# Patient Record
Sex: MALE | Race: WHITE | NOT HISPANIC OR LATINO | Employment: FULL TIME | URBAN - METROPOLITAN AREA
[De-identification: names, ages, dates, MRNs, and addresses within clinical notes are randomized per-mention and may not be internally consistent; named-entity substitution may affect disease eponyms.]

---
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
## 2021-03-08 ENCOUNTER — TELEPHONE (OUTPATIENT)
Dept: NEUROLOGY | Facility: HOSPITAL | Age: 21
End: 2021-03-08

## 2021-03-12 ENCOUNTER — TELEPHONE (OUTPATIENT)
Dept: NEUROLOGY | Facility: HOSPITAL | Age: 21
End: 2021-03-12

## 2021-05-25 ENCOUNTER — TELEPHONE (OUTPATIENT)
Dept: PEDIATRICS | Facility: CLINIC | Age: 21
End: 2021-05-25

## 2021-07-06 ENCOUNTER — TELEPHONE (OUTPATIENT)
Dept: NEUROLOGY | Facility: HOSPITAL | Age: 21
End: 2021-07-06

## 2021-07-09 ENCOUNTER — OCCUPATIONAL HEALTH (OUTPATIENT)
Dept: URGENT CARE | Facility: CLINIC | Age: 21
End: 2021-07-09

## 2021-07-09 ENCOUNTER — TELEPHONE (OUTPATIENT)
Dept: INTERNAL MEDICINE | Facility: CLINIC | Age: 21
End: 2021-07-09

## 2021-07-09 DIAGNOSIS — Z00.00 PHYSICAL EXAM: Primary | ICD-10-CM

## 2021-07-09 PROCEDURE — 99499 RETURN TO WORK EXAM: BASIC: ICD-10-PCS | Mod: S$GLB,,, | Performed by: PREVENTIVE MEDICINE

## 2021-07-09 PROCEDURE — 99499 UNLISTED E&M SERVICE: CPT | Mod: S$GLB,,, | Performed by: PREVENTIVE MEDICINE

## 2021-07-12 ENCOUNTER — OCCUPATIONAL HEALTH (OUTPATIENT)
Dept: URGENT CARE | Facility: CLINIC | Age: 21
End: 2021-07-12

## 2021-07-12 ENCOUNTER — OFFICE VISIT (OUTPATIENT)
Dept: FAMILY MEDICINE | Facility: CLINIC | Age: 21
End: 2021-07-12
Payer: COMMERCIAL

## 2021-07-12 VITALS
SYSTOLIC BLOOD PRESSURE: 118 MMHG | BODY MASS INDEX: 36.03 KG/M2 | TEMPERATURE: 98 F | HEIGHT: 72 IN | HEART RATE: 70 BPM | RESPIRATION RATE: 18 BRPM | WEIGHT: 266 LBS | DIASTOLIC BLOOD PRESSURE: 80 MMHG | OXYGEN SATURATION: 98 %

## 2021-07-12 DIAGNOSIS — R11.2 NON-INTRACTABLE VOMITING WITH NAUSEA, UNSPECIFIED VOMITING TYPE: Primary | ICD-10-CM

## 2021-07-12 DIAGNOSIS — Z02.89 ENCOUNTER FOR PHYSICAL EXAMINATION RELATED TO EMPLOYMENT: ICD-10-CM

## 2021-07-12 PROCEDURE — 3008F PR BODY MASS INDEX (BMI) DOCUMENTED: ICD-10-PCS | Mod: CPTII,S$GLB,, | Performed by: INTERNAL MEDICINE

## 2021-07-12 PROCEDURE — 99499 UNLISTED E&M SERVICE: CPT | Mod: S$GLB,,, | Performed by: NURSE PRACTITIONER

## 2021-07-12 PROCEDURE — 99204 OFFICE O/P NEW MOD 45 MIN: CPT | Mod: S$GLB,,, | Performed by: INTERNAL MEDICINE

## 2021-07-12 PROCEDURE — 1126F PR PAIN SEVERITY QUANTIFIED, NO PAIN PRESENT: ICD-10-PCS | Mod: S$GLB,,, | Performed by: INTERNAL MEDICINE

## 2021-07-12 PROCEDURE — 99204 PR OFFICE/OUTPT VISIT, NEW, LEVL IV, 45-59 MIN: ICD-10-PCS | Mod: S$GLB,,, | Performed by: INTERNAL MEDICINE

## 2021-07-12 PROCEDURE — 1126F AMNT PAIN NOTED NONE PRSNT: CPT | Mod: S$GLB,,, | Performed by: INTERNAL MEDICINE

## 2021-07-12 PROCEDURE — 99999 PR PBB SHADOW E&M-EST. PATIENT-LVL III: ICD-10-PCS | Mod: PBBFAC,,, | Performed by: INTERNAL MEDICINE

## 2021-07-12 PROCEDURE — 3008F BODY MASS INDEX DOCD: CPT | Mod: CPTII,S$GLB,, | Performed by: INTERNAL MEDICINE

## 2021-07-12 PROCEDURE — 99999 PR PBB SHADOW E&M-EST. PATIENT-LVL III: CPT | Mod: PBBFAC,,, | Performed by: INTERNAL MEDICINE

## 2021-07-12 PROCEDURE — 99499 RETURN TO WORK EXAM: BASIC: ICD-10-PCS | Mod: S$GLB,,, | Performed by: NURSE PRACTITIONER

## 2021-07-12 RX ORDER — ONDANSETRON 4 MG/1
4 TABLET, FILM COATED ORAL EVERY 8 HOURS PRN
Qty: 30 TABLET | Refills: 0 | Status: SHIPPED | OUTPATIENT
Start: 2021-07-12 | End: 2021-08-03

## 2021-07-26 ENCOUNTER — OCCUPATIONAL HEALTH (OUTPATIENT)
Dept: URGENT CARE | Facility: CLINIC | Age: 21
End: 2021-07-26

## 2021-07-26 DIAGNOSIS — Z02.1 PRE-EMPLOYMENT EXAMINATION: Primary | ICD-10-CM

## 2021-07-26 PROCEDURE — 99499 UNLISTED E&M SERVICE: CPT | Mod: S$GLB,,, | Performed by: EMERGENCY MEDICINE

## 2021-07-26 PROCEDURE — 99499 RETURN TO WORK EXAM: BASIC: ICD-10-PCS | Mod: S$GLB,,, | Performed by: EMERGENCY MEDICINE

## 2021-07-28 ENCOUNTER — TELEPHONE (OUTPATIENT)
Dept: NEUROLOGY | Facility: HOSPITAL | Age: 21
End: 2021-07-28

## 2021-07-30 ENCOUNTER — TELEPHONE (OUTPATIENT)
Dept: NEUROLOGY | Facility: HOSPITAL | Age: 21
End: 2021-07-30

## 2021-07-30 ENCOUNTER — PATIENT MESSAGE (OUTPATIENT)
Dept: NEUROLOGY | Facility: HOSPITAL | Age: 21
End: 2021-07-30

## 2021-08-02 ENCOUNTER — HOSPITAL ENCOUNTER (OUTPATIENT)
Dept: RADIOLOGY | Facility: HOSPITAL | Age: 21
Discharge: HOME | End: 2021-08-02
Attending: INTERNAL MEDICINE
Payer: COMMERCIAL

## 2021-08-02 DIAGNOSIS — Z85.9 HISTORY OF MALIGNANT NEUROENDOCRINE TUMOR: ICD-10-CM

## 2021-08-02 PROCEDURE — A9585 GADOBUTROL INJECTION: HCPCS | Performed by: INTERNAL MEDICINE

## 2021-08-02 PROCEDURE — 74183 MRI ABD W/O CNTR FLWD CNTR: CPT | Mod: TC

## 2021-08-02 PROCEDURE — 74183 MRI ABDOMEN W WO CONTRAST: ICD-10-PCS | Mod: 26,,, | Performed by: RADIOLOGY

## 2021-08-02 PROCEDURE — 25500020 PHARM REV CODE 255: Performed by: INTERNAL MEDICINE

## 2021-08-02 PROCEDURE — 74183 MRI ABD W/O CNTR FLWD CNTR: CPT | Mod: 26,,, | Performed by: RADIOLOGY

## 2021-08-02 RX ORDER — GADOBUTROL 604.72 MG/ML
10 INJECTION INTRAVENOUS
Status: COMPLETED | OUTPATIENT
Start: 2021-08-02 | End: 2021-08-02

## 2021-08-02 RX ADMIN — GADOBUTROL 10 ML: 604.72 INJECTION INTRAVENOUS at 10:08

## 2021-08-03 ENCOUNTER — PATIENT MESSAGE (OUTPATIENT)
Dept: NEUROLOGY | Facility: HOSPITAL | Age: 21
End: 2021-08-03

## 2021-08-03 ENCOUNTER — OFFICE VISIT (OUTPATIENT)
Dept: NEUROLOGY | Facility: HOSPITAL | Age: 21
End: 2021-08-03
Attending: INTERNAL MEDICINE
Payer: COMMERCIAL

## 2021-08-03 DIAGNOSIS — D3A.8 NEUROENDOCRINE TUMOR OF PANCREAS: Primary | ICD-10-CM

## 2021-08-03 DIAGNOSIS — K75.4 AUTOIMMUNE HEPATITIS: ICD-10-CM

## 2021-08-03 DIAGNOSIS — C77.9 REGIONAL LYMPH NODE METASTASIS PRESENT: ICD-10-CM

## 2021-08-03 DIAGNOSIS — Z12.89 ENCOUNTER FOR SCREENING FOR MALIGNANT NEOPLASM OF OTHER SITES: ICD-10-CM

## 2021-08-03 PROBLEM — Z85.9 HISTORY OF MALIGNANT NEUROENDOCRINE TUMOR: Status: RESOLVED | Noted: 2020-08-21 | Resolved: 2021-08-03

## 2021-08-10 ENCOUNTER — TELEPHONE (OUTPATIENT)
Dept: HEPATOLOGY | Facility: CLINIC | Age: 21
End: 2021-08-10

## 2021-08-11 ENCOUNTER — OFFICE VISIT (OUTPATIENT)
Dept: HEPATOLOGY | Facility: CLINIC | Age: 21
End: 2021-08-11
Payer: COMMERCIAL

## 2021-08-11 VITALS
OXYGEN SATURATION: 96 % | TEMPERATURE: 99 F | BODY MASS INDEX: 36.28 KG/M2 | SYSTOLIC BLOOD PRESSURE: 129 MMHG | DIASTOLIC BLOOD PRESSURE: 63 MMHG | HEIGHT: 72 IN | HEART RATE: 75 BPM | RESPIRATION RATE: 18 BRPM | WEIGHT: 267.88 LBS

## 2021-08-11 DIAGNOSIS — R74.8 ELEVATED LIVER ENZYMES: ICD-10-CM

## 2021-08-11 DIAGNOSIS — K75.4 AUTOIMMUNE HEPATITIS: ICD-10-CM

## 2021-08-11 DIAGNOSIS — K75.81 NASH (NONALCOHOLIC STEATOHEPATITIS): Primary | ICD-10-CM

## 2021-08-11 DIAGNOSIS — K74.01 HEPATIC FIBROSIS, EARLY FIBROSIS: ICD-10-CM

## 2021-08-11 PROCEDURE — 99204 OFFICE O/P NEW MOD 45 MIN: CPT | Mod: S$GLB,,, | Performed by: NURSE PRACTITIONER

## 2021-08-11 PROCEDURE — 3008F PR BODY MASS INDEX (BMI) DOCUMENTED: ICD-10-PCS | Mod: CPTII,S$GLB,, | Performed by: NURSE PRACTITIONER

## 2021-08-11 PROCEDURE — 3074F SYST BP LT 130 MM HG: CPT | Mod: CPTII,S$GLB,, | Performed by: NURSE PRACTITIONER

## 2021-08-11 PROCEDURE — 3074F PR MOST RECENT SYSTOLIC BLOOD PRESSURE < 130 MM HG: ICD-10-PCS | Mod: CPTII,S$GLB,, | Performed by: NURSE PRACTITIONER

## 2021-08-11 PROCEDURE — 99999 PR PBB SHADOW E&M-EST. PATIENT-LVL IV: ICD-10-PCS | Mod: PBBFAC,,, | Performed by: NURSE PRACTITIONER

## 2021-08-11 PROCEDURE — 3008F BODY MASS INDEX DOCD: CPT | Mod: CPTII,S$GLB,, | Performed by: NURSE PRACTITIONER

## 2021-08-11 PROCEDURE — 1159F PR MEDICATION LIST DOCUMENTED IN MEDICAL RECORD: ICD-10-PCS | Mod: CPTII,S$GLB,, | Performed by: NURSE PRACTITIONER

## 2021-08-11 PROCEDURE — 99999 PR PBB SHADOW E&M-EST. PATIENT-LVL IV: CPT | Mod: PBBFAC,,, | Performed by: NURSE PRACTITIONER

## 2021-08-11 PROCEDURE — 1126F PR PAIN SEVERITY QUANTIFIED, NO PAIN PRESENT: ICD-10-PCS | Mod: CPTII,S$GLB,, | Performed by: NURSE PRACTITIONER

## 2021-08-11 PROCEDURE — 3078F DIAST BP <80 MM HG: CPT | Mod: CPTII,S$GLB,, | Performed by: NURSE PRACTITIONER

## 2021-08-11 PROCEDURE — 1159F MED LIST DOCD IN RCRD: CPT | Mod: CPTII,S$GLB,, | Performed by: NURSE PRACTITIONER

## 2021-08-11 PROCEDURE — 1126F AMNT PAIN NOTED NONE PRSNT: CPT | Mod: CPTII,S$GLB,, | Performed by: NURSE PRACTITIONER

## 2021-08-11 PROCEDURE — 3078F PR MOST RECENT DIASTOLIC BLOOD PRESSURE < 80 MM HG: ICD-10-PCS | Mod: CPTII,S$GLB,, | Performed by: NURSE PRACTITIONER

## 2021-08-11 PROCEDURE — 99204 PR OFFICE/OUTPT VISIT, NEW, LEVL IV, 45-59 MIN: ICD-10-PCS | Mod: S$GLB,,, | Performed by: NURSE PRACTITIONER

## 2021-08-12 PROBLEM — K75.81 NASH (NONALCOHOLIC STEATOHEPATITIS): Status: ACTIVE | Noted: 2021-08-12

## 2021-08-12 PROBLEM — K74.01 HEPATIC FIBROSIS, EARLY FIBROSIS: Status: ACTIVE | Noted: 2021-08-12

## 2021-08-12 PROBLEM — R11.2 NON-INTRACTABLE VOMITING WITH NAUSEA: Status: RESOLVED | Noted: 2021-07-12 | Resolved: 2021-08-12

## 2021-08-12 PROBLEM — R93.5 ABNORMAL CT OF THE ABDOMEN: Status: RESOLVED | Noted: 2017-08-25 | Resolved: 2021-08-12

## 2021-08-13 ENCOUNTER — TELEPHONE (OUTPATIENT)
Dept: HEPATOLOGY | Facility: CLINIC | Age: 21
End: 2021-08-13

## 2021-08-13 DIAGNOSIS — K75.4 AUTOIMMUNE HEPATITIS: Primary | ICD-10-CM

## 2021-08-13 DIAGNOSIS — K75.81 NASH (NONALCOHOLIC STEATOHEPATITIS): ICD-10-CM

## 2021-08-13 DIAGNOSIS — K75.81 NASH (NONALCOHOLIC STEATOHEPATITIS): Primary | ICD-10-CM

## 2021-08-17 ENCOUNTER — TELEPHONE (OUTPATIENT)
Dept: HEPATOLOGY | Facility: CLINIC | Age: 21
End: 2021-08-17

## 2021-08-18 ENCOUNTER — TELEPHONE (OUTPATIENT)
Dept: HEPATOLOGY | Facility: CLINIC | Age: 21
End: 2021-08-18

## 2021-08-19 ENCOUNTER — LAB VISIT (OUTPATIENT)
Dept: LAB | Facility: HOSPITAL | Age: 21
End: 2021-08-19
Payer: COMMERCIAL

## 2021-08-19 ENCOUNTER — TELEPHONE (OUTPATIENT)
Dept: HEPATOLOGY | Facility: CLINIC | Age: 21
End: 2021-08-19

## 2021-08-19 DIAGNOSIS — K74.00 LIVER FIBROSIS: ICD-10-CM

## 2021-08-19 DIAGNOSIS — K75.4 AUTOIMMUNE HEPATITIS: Primary | ICD-10-CM

## 2021-08-19 DIAGNOSIS — K75.81 NASH (NONALCOHOLIC STEATOHEPATITIS): ICD-10-CM

## 2021-08-19 DIAGNOSIS — K75.4 AUTOIMMUNE HEPATITIS: ICD-10-CM

## 2021-08-19 PROCEDURE — 88321 PR  MICROSLIDE CONSULT: ICD-10-PCS | Mod: ,,, | Performed by: PATHOLOGY

## 2021-08-19 PROCEDURE — 88321 CONSLTJ&REPRT SLD PREP ELSWR: CPT | Mod: ,,, | Performed by: PATHOLOGY

## 2021-08-19 PROCEDURE — 88321 CONSLTJ&REPRT SLD PREP ELSWR: CPT | Performed by: PATHOLOGY

## 2021-08-23 LAB
COMMENT: NORMAL
FINAL PATHOLOGIC DIAGNOSIS: NORMAL
GROSS: NORMAL
Lab: NORMAL
MICROSCOPIC EXAM: NORMAL

## 2021-08-25 ENCOUNTER — TELEPHONE (OUTPATIENT)
Dept: HEPATOLOGY | Facility: CLINIC | Age: 21
End: 2021-08-25

## 2021-09-28 ENCOUNTER — OCCUPATIONAL HEALTH (OUTPATIENT)
Dept: URGENT CARE | Facility: CLINIC | Age: 21
End: 2021-09-28

## 2021-09-28 DIAGNOSIS — Z76.89 RETURN TO WORK EVALUATION: Primary | ICD-10-CM

## 2021-09-28 PROCEDURE — 99499 RETURN TO WORK EXAM: BASIC: ICD-10-PCS | Mod: S$GLB,,, | Performed by: EMERGENCY MEDICINE

## 2021-09-28 PROCEDURE — 99499 UNLISTED E&M SERVICE: CPT | Mod: S$GLB,,, | Performed by: EMERGENCY MEDICINE

## 2021-11-18 ENCOUNTER — LAB VISIT (OUTPATIENT)
Dept: LAB | Facility: HOSPITAL | Age: 21
End: 2021-11-18
Attending: NURSE PRACTITIONER
Payer: COMMERCIAL

## 2021-11-18 DIAGNOSIS — K75.4 AUTOIMMUNE HEPATITIS: ICD-10-CM

## 2021-11-18 DIAGNOSIS — K75.81 NASH (NONALCOHOLIC STEATOHEPATITIS): ICD-10-CM

## 2021-11-18 LAB
ALBUMIN SERPL BCP-MCNC: 4.3 G/DL (ref 3.5–5.2)
ALP SERPL-CCNC: 65 U/L (ref 55–135)
ALT SERPL W/O P-5'-P-CCNC: 69 U/L (ref 10–44)
ANION GAP SERPL CALC-SCNC: 7 MMOL/L (ref 8–16)
AST SERPL-CCNC: 37 U/L (ref 10–40)
BILIRUB SERPL-MCNC: 1.1 MG/DL (ref 0.1–1)
BUN SERPL-MCNC: 13 MG/DL (ref 6–20)
CALCIUM SERPL-MCNC: 9.8 MG/DL (ref 8.7–10.5)
CHLORIDE SERPL-SCNC: 103 MMOL/L (ref 95–110)
CO2 SERPL-SCNC: 29 MMOL/L (ref 23–29)
CREAT SERPL-MCNC: 1.1 MG/DL (ref 0.5–1.4)
EST. GFR  (AFRICAN AMERICAN): >60 ML/MIN/1.73 M^2
EST. GFR  (NON AFRICAN AMERICAN): >60 ML/MIN/1.73 M^2
ESTIMATED AVG GLUCOSE: 100 MG/DL (ref 68–131)
GLUCOSE SERPL-MCNC: 99 MG/DL (ref 70–110)
HBA1C MFR BLD: 5.1 % (ref 4–5.6)
POTASSIUM SERPL-SCNC: 4.1 MMOL/L (ref 3.5–5.1)
PROT SERPL-MCNC: 7.2 G/DL (ref 6–8.4)
SODIUM SERPL-SCNC: 139 MMOL/L (ref 136–145)

## 2021-11-18 PROCEDURE — 80053 COMPREHEN METABOLIC PANEL: CPT | Performed by: NURSE PRACTITIONER

## 2021-11-18 PROCEDURE — 36415 COLL VENOUS BLD VENIPUNCTURE: CPT | Performed by: NURSE PRACTITIONER

## 2021-11-18 PROCEDURE — 83036 HEMOGLOBIN GLYCOSYLATED A1C: CPT | Performed by: NURSE PRACTITIONER

## 2021-12-05 ENCOUNTER — TELEPHONE (OUTPATIENT)
Dept: HEPATOLOGY | Facility: CLINIC | Age: 21
End: 2021-12-05
Payer: COMMERCIAL

## 2021-12-08 ENCOUNTER — TELEPHONE (OUTPATIENT)
Dept: FAMILY MEDICINE | Facility: CLINIC | Age: 21
End: 2021-12-08
Payer: COMMERCIAL

## 2021-12-08 ENCOUNTER — PATIENT MESSAGE (OUTPATIENT)
Dept: FAMILY MEDICINE | Facility: CLINIC | Age: 21
End: 2021-12-08
Payer: COMMERCIAL

## 2021-12-08 DIAGNOSIS — U07.1 COVID-19: Primary | ICD-10-CM

## 2021-12-09 ENCOUNTER — INFUSION (OUTPATIENT)
Dept: INFECTIOUS DISEASES | Facility: HOSPITAL | Age: 21
End: 2021-12-09
Attending: INTERNAL MEDICINE
Payer: COMMERCIAL

## 2021-12-09 VITALS
HEIGHT: 73 IN | OXYGEN SATURATION: 98 % | SYSTOLIC BLOOD PRESSURE: 138 MMHG | HEART RATE: 92 BPM | BODY MASS INDEX: 33.13 KG/M2 | TEMPERATURE: 98 F | DIASTOLIC BLOOD PRESSURE: 75 MMHG | RESPIRATION RATE: 16 BRPM | WEIGHT: 250 LBS

## 2021-12-09 DIAGNOSIS — U07.1 COVID-19: Primary | ICD-10-CM

## 2021-12-09 PROCEDURE — M0243 CASIRIVI AND IMDEVI INFUSION: HCPCS | Performed by: INTERNAL MEDICINE

## 2021-12-09 PROCEDURE — 25000003 PHARM REV CODE 250: Performed by: INTERNAL MEDICINE

## 2021-12-09 PROCEDURE — 63600175 PHARM REV CODE 636 W HCPCS: Performed by: INTERNAL MEDICINE

## 2021-12-09 RX ORDER — EPINEPHRINE 0.3 MG/.3ML
0.3 INJECTION SUBCUTANEOUS
Status: DISCONTINUED | OUTPATIENT
Start: 2021-12-09 | End: 2022-07-18

## 2021-12-09 RX ORDER — SODIUM CHLORIDE 0.9 % (FLUSH) 0.9 %
10 SYRINGE (ML) INJECTION
Status: DISCONTINUED | OUTPATIENT
Start: 2021-12-09 | End: 2022-07-18

## 2021-12-09 RX ORDER — ONDANSETRON 4 MG/1
4 TABLET, ORALLY DISINTEGRATING ORAL ONCE AS NEEDED
Status: DISCONTINUED | OUTPATIENT
Start: 2021-12-09 | End: 2022-07-18

## 2021-12-09 RX ORDER — ALBUTEROL SULFATE 90 UG/1
2 AEROSOL, METERED RESPIRATORY (INHALATION)
Status: DISCONTINUED | OUTPATIENT
Start: 2021-12-09 | End: 2022-07-18

## 2021-12-09 RX ORDER — DIPHENHYDRAMINE HYDROCHLORIDE 50 MG/ML
25 INJECTION INTRAMUSCULAR; INTRAVENOUS ONCE AS NEEDED
Status: DISCONTINUED | OUTPATIENT
Start: 2021-12-09 | End: 2022-07-18

## 2021-12-09 RX ADMIN — CASIRIVIMAB AND IMDEVIMAB 600 MG: 600; 600 INJECTION, SOLUTION, CONCENTRATE INTRAVENOUS at 11:12

## 2021-12-17 ENCOUNTER — TELEPHONE (OUTPATIENT)
Dept: HEPATOLOGY | Facility: CLINIC | Age: 21
End: 2021-12-17
Payer: COMMERCIAL

## 2022-01-07 ENCOUNTER — PATIENT MESSAGE (OUTPATIENT)
Dept: HEPATOLOGY | Facility: CLINIC | Age: 22
End: 2022-01-07
Payer: COMMERCIAL

## 2022-02-07 ENCOUNTER — LAB VISIT (OUTPATIENT)
Dept: LAB | Facility: HOSPITAL | Age: 22
End: 2022-02-07
Attending: NURSE PRACTITIONER
Payer: COMMERCIAL

## 2022-02-07 DIAGNOSIS — K75.4 AUTOIMMUNE HEPATITIS: ICD-10-CM

## 2022-02-07 DIAGNOSIS — K75.81 NASH (NONALCOHOLIC STEATOHEPATITIS): ICD-10-CM

## 2022-02-07 LAB
ALBUMIN SERPL BCP-MCNC: 4.2 G/DL (ref 3.5–5.2)
ALP SERPL-CCNC: 64 U/L (ref 55–135)
ALT SERPL W/O P-5'-P-CCNC: 68 U/L (ref 10–44)
ANION GAP SERPL CALC-SCNC: 7 MMOL/L (ref 8–16)
AST SERPL-CCNC: 37 U/L (ref 10–40)
BILIRUB SERPL-MCNC: 0.7 MG/DL (ref 0.1–1)
BUN SERPL-MCNC: 10 MG/DL (ref 6–20)
CALCIUM SERPL-MCNC: 9.6 MG/DL (ref 8.7–10.5)
CHLORIDE SERPL-SCNC: 103 MMOL/L (ref 95–110)
CO2 SERPL-SCNC: 29 MMOL/L (ref 23–29)
CREAT SERPL-MCNC: 1.1 MG/DL (ref 0.5–1.4)
EST. GFR  (AFRICAN AMERICAN): >60 ML/MIN/1.73 M^2
EST. GFR  (NON AFRICAN AMERICAN): >60 ML/MIN/1.73 M^2
GLUCOSE SERPL-MCNC: 120 MG/DL (ref 70–110)
POTASSIUM SERPL-SCNC: 4.1 MMOL/L (ref 3.5–5.1)
PROT SERPL-MCNC: 6.9 G/DL (ref 6–8.4)
SODIUM SERPL-SCNC: 139 MMOL/L (ref 136–145)

## 2022-02-07 PROCEDURE — 80053 COMPREHEN METABOLIC PANEL: CPT | Performed by: NURSE PRACTITIONER

## 2022-02-07 PROCEDURE — 36415 COLL VENOUS BLD VENIPUNCTURE: CPT | Performed by: NURSE PRACTITIONER

## 2022-03-17 ENCOUNTER — OFFICE VISIT (OUTPATIENT)
Dept: HEPATOLOGY | Facility: CLINIC | Age: 22
End: 2022-03-17
Payer: COMMERCIAL

## 2022-03-17 VITALS
DIASTOLIC BLOOD PRESSURE: 64 MMHG | RESPIRATION RATE: 18 BRPM | WEIGHT: 277.31 LBS | OXYGEN SATURATION: 96 % | HEIGHT: 73 IN | BODY MASS INDEX: 36.75 KG/M2 | HEART RATE: 79 BPM | SYSTOLIC BLOOD PRESSURE: 130 MMHG | TEMPERATURE: 100 F

## 2022-03-17 DIAGNOSIS — K75.81 NASH (NONALCOHOLIC STEATOHEPATITIS): Primary | ICD-10-CM

## 2022-03-17 DIAGNOSIS — K74.00 HEPATIC FIBROSIS: ICD-10-CM

## 2022-03-17 PROCEDURE — 99999 PR PBB SHADOW E&M-EST. PATIENT-LVL IV: ICD-10-PCS | Mod: PBBFAC,,, | Performed by: STUDENT IN AN ORGANIZED HEALTH CARE EDUCATION/TRAINING PROGRAM

## 2022-03-17 PROCEDURE — 3075F PR MOST RECENT SYSTOLIC BLOOD PRESS GE 130-139MM HG: ICD-10-PCS | Mod: CPTII,S$GLB,, | Performed by: STUDENT IN AN ORGANIZED HEALTH CARE EDUCATION/TRAINING PROGRAM

## 2022-03-17 PROCEDURE — 3008F PR BODY MASS INDEX (BMI) DOCUMENTED: ICD-10-PCS | Mod: CPTII,S$GLB,, | Performed by: STUDENT IN AN ORGANIZED HEALTH CARE EDUCATION/TRAINING PROGRAM

## 2022-03-17 PROCEDURE — 1160F PR REVIEW ALL MEDS BY PRESCRIBER/CLIN PHARMACIST DOCUMENTED: ICD-10-PCS | Mod: CPTII,S$GLB,, | Performed by: STUDENT IN AN ORGANIZED HEALTH CARE EDUCATION/TRAINING PROGRAM

## 2022-03-17 PROCEDURE — 99214 PR OFFICE/OUTPT VISIT, EST, LEVL IV, 30-39 MIN: ICD-10-PCS | Mod: S$GLB,,, | Performed by: STUDENT IN AN ORGANIZED HEALTH CARE EDUCATION/TRAINING PROGRAM

## 2022-03-17 PROCEDURE — 3078F DIAST BP <80 MM HG: CPT | Mod: CPTII,S$GLB,, | Performed by: STUDENT IN AN ORGANIZED HEALTH CARE EDUCATION/TRAINING PROGRAM

## 2022-03-17 PROCEDURE — 99999 PR PBB SHADOW E&M-EST. PATIENT-LVL IV: CPT | Mod: PBBFAC,,, | Performed by: STUDENT IN AN ORGANIZED HEALTH CARE EDUCATION/TRAINING PROGRAM

## 2022-03-17 PROCEDURE — 1159F MED LIST DOCD IN RCRD: CPT | Mod: CPTII,S$GLB,, | Performed by: STUDENT IN AN ORGANIZED HEALTH CARE EDUCATION/TRAINING PROGRAM

## 2022-03-17 PROCEDURE — 3008F BODY MASS INDEX DOCD: CPT | Mod: CPTII,S$GLB,, | Performed by: STUDENT IN AN ORGANIZED HEALTH CARE EDUCATION/TRAINING PROGRAM

## 2022-03-17 PROCEDURE — 1160F RVW MEDS BY RX/DR IN RCRD: CPT | Mod: CPTII,S$GLB,, | Performed by: STUDENT IN AN ORGANIZED HEALTH CARE EDUCATION/TRAINING PROGRAM

## 2022-03-17 PROCEDURE — 3078F PR MOST RECENT DIASTOLIC BLOOD PRESSURE < 80 MM HG: ICD-10-PCS | Mod: CPTII,S$GLB,, | Performed by: STUDENT IN AN ORGANIZED HEALTH CARE EDUCATION/TRAINING PROGRAM

## 2022-03-17 PROCEDURE — 3075F SYST BP GE 130 - 139MM HG: CPT | Mod: CPTII,S$GLB,, | Performed by: STUDENT IN AN ORGANIZED HEALTH CARE EDUCATION/TRAINING PROGRAM

## 2022-03-17 PROCEDURE — 99214 OFFICE O/P EST MOD 30 MIN: CPT | Mod: S$GLB,,, | Performed by: STUDENT IN AN ORGANIZED HEALTH CARE EDUCATION/TRAINING PROGRAM

## 2022-03-17 PROCEDURE — 1159F PR MEDICATION LIST DOCUMENTED IN MEDICAL RECORD: ICD-10-PCS | Mod: CPTII,S$GLB,, | Performed by: STUDENT IN AN ORGANIZED HEALTH CARE EDUCATION/TRAINING PROGRAM

## 2022-03-17 NOTE — PROGRESS NOTES
Hepatology Follow Up Note    Referring provider: No ref. provider found  PCP: Torsten Renteria MD    Chief complaint: follow up DANIELS    HPI:  Homer Beatty is a 21 y.o. male with history of DANIELS who presents for scheduled follow up.    He is without complaints today.  No recent hospitalizations or ED visits. He denies signs of decompensated liver disease including no recent abdominal distension, encephalopathy, jaundice, GI bleeding.    Background (Reanna Jimenez, TERRY):   This is a 21 y.o. male referred for evaluation of autoimmune hepatitis and DANIELS.  He is here today with grandmother.      Autoimmune hepatitis diagnosed around age 6 or 7. Grandmother reports his liver was extremely enlarged at this time. He has been followed by Dr. Dusty Nieves (Peds GI at [facility]). On 6-MP for years and has had multiple liver biopsies.        6 MP has since been gradually weaned; stopped completely 3/2/21.     Liver biopsy recently completed at [facility] 7/21/21 (report scanned to media):  - Moderate steatosis with features of steatohepatitis (REYNA score 6 out of 8)  - Mild periportal fibrosis (stage 1)  - No significant portal inflammation  - No histiologic features of autoimmune hepatitis      His transaminases have been normal-mildly elevated, ALT 40s.  Synthetic function WNL.      Risk factors for fatty liver:     · Weight -- Body mass index is 36.33 kg/m². Weight has been trending up over the last year; drinks large quantities of coke and sometimes sweet tea. Not following any particular diet or watching what he eats.                        · Dyslipidemia -- mildly elevated cholesterol                              · Insulin resistance / diabetes -- no HgbA1c for review         · Hypertension -- no  · Alcohol use -- no     He also has a history of NET of pancreas s/p partial pancreatectomy (2017). Of note, liver biopsy at time of pancreatectomy showed mild macrosteatosis (10-20%), no fibrosis. Followed by   [facility]. Continues to have annual MRI surveillance. Strong family h/o CA including carcinoid.     MRI 8/2/21- hepatosplenomegaly, hepatic steatosis, hyperintense focus in R hepatic lobe favored to represent sequela of prior liver biopsy      He feels well, no concerns.  No s/s hepatic decompensation.     Works for the [employer]. Trying to decide between a job here or in [place]. Wants to become a . May be gone for a few months if he goes to [place].     Past Medical History:   Diagnosis Date    Autoimmune hepatitis     Family history of carcinoid tumor 11/1/2017    Fever blister     Fibrosis of liver     History of malignant neuroendocrine tumor 8/21/2020    IgA deficiency, selective     New onset of headaches 2/12/2018    Pancreatic cyst 5/7/2018    Regional lymph node metastasis present 11/1/2017       Past Surgical History:   Procedure Laterality Date    CIRCUMCISION      LIVER BIOPSY  3/15/2013    PANCREATECTOMY      TONSILLECTOMY, ADENOIDECTOMY         Family History   Problem Relation Age of Onset    No Known Problems Mother     No Known Problems Father     Cancer Maternal Grandfather         unknown    No Known Problems Brother     Breast cancer Paternal Aunt     Cancer Paternal Aunt         breast (BRCA2)    Heart disease Maternal Grandmother     Hypertension Maternal Grandmother     Diabetes Maternal Grandmother     No Known Problems Paternal Grandmother     Cancer Paternal Grandfather         Small intestine carcinoid    Colon polyps Paternal Grandfather     No Known Problems Brother     Cancer Maternal Aunt         breast    Melanoma Neg Hx     Lupus Neg Hx     Psoriasis Neg Hx     Congenital heart disease Neg Hx     Pacemaker/defibrilator Neg Hx     Early death Neg Hx        Social History     Tobacco Use    Smoking status: Never Smoker    Smokeless tobacco: Never Used   Substance Use Topics    Alcohol use: No    Drug use: No       Current  "Outpatient Medications   Medication Sig Dispense Refill    fish oil-omega-3 fatty acids 300-1,000 mg capsule Take 2 g by mouth once daily.       Current Facility-Administered Medications   Medication Dose Route Frequency Provider Last Rate Last Admin    albuterol inhaler 2 puff  2 puff Inhalation Q20 Min PRN Mani Rascon MD        diphenhydrAMINE injection 25 mg  25 mg Intravenous Once PRN Mani Rascon MD        EPINEPHrine (EPIPEN) 0.3 mg/0.3 mL pen injection 0.3 mg  0.3 mg Intramuscular PRN Mani Rascon MD        methylPREDNISolone sodium succinate injection 40 mg  40 mg Intravenous Once PRN Mani Rascon MD        ondansetron disintegrating tablet 4 mg  4 mg Oral Once PRN Mani Rascon MD        sodium chloride 0.9% 500 mL flush bag   Intravenous PRN Mani Rascon MD        sodium chloride 0.9% flush 10 mL  10 mL Intravenous PRN Mani Rascon MD           Review of patient's allergies indicates:   Allergen Reactions    Tylenol [acetaminophen]      Patient has Autoimmune Hepatitis/Takes 6MP    Latex, natural rubber Rash       Review of Systems   Constitutional: Negative for fever and weight loss.   Cardiovascular: Negative for leg swelling.   Gastrointestinal: Negative for abdominal pain, blood in stool, constipation, diarrhea, heartburn, melena, nausea and vomiting.       Vitals:    03/17/22 1541   BP: 130/64   Pulse: 79   Resp: 18   Temp: 99.9 °F (37.7 °C)   TempSrc: Oral   SpO2: 96%   Weight: 125.8 kg (277 lb 5.4 oz)   Height: 6' 1" (1.854 m)       Physical Exam  Vitals reviewed.   Constitutional:       General: He is not in acute distress.  Eyes:      General: No scleral icterus.  Cardiovascular:      Rate and Rhythm: Normal rate and regular rhythm.   Pulmonary:      Effort: Pulmonary effort is normal. No respiratory distress.   Abdominal:      General: Bowel sounds are normal. There is no distension.      Palpations: Abdomen is soft.      Tenderness: There is no " abdominal tenderness. There is no guarding or rebound.   Musculoskeletal:      Right lower leg: No edema.      Left lower leg: No edema.   Skin:     Coloration: Skin is not jaundiced.         LABS: I personally reviewed pertinent laboratory findings.    Lab Results   Component Value Date    WBC 6.86 08/02/2021    HGB 16.0 08/02/2021    HCT 47.1 08/02/2021    MCV 91 08/02/2021     08/02/2021       Lab Results   Component Value Date     02/07/2022    K 4.1 02/07/2022     02/07/2022    CO2 29 02/07/2022    BUN 10 02/07/2022    CREATININE 1.1 02/07/2022    CALCIUM 9.6 02/07/2022    ANIONGAP 7 (L) 02/07/2022    ESTGFRAFRICA >60.0 02/07/2022    EGFRNONAA >60.0 02/07/2022       Lab Results   Component Value Date    ALT 68 (H) 02/07/2022    AST 37 02/07/2022    GGT 27 05/20/2019    ALKPHOS 64 02/07/2022    BILITOT 0.7 02/07/2022       Lab Results   Component Value Date    HEPAIGM Negative 04/20/2005    HEPBIGM Negative 04/20/2005    HEPCAB Negative 04/20/2005       Lab Results   Component Value Date    JAN Negative 04/02/2009    SMOOTHMUSCAB Negative 04/02/2009    CERULOPLSM 25.8 04/02/2009        IMAGING: I personally reviewed imaging studies.    Assessment:  21 y.o. male with history of DANIELS who presents for scheduled follow up.  He has reported history of autoimmune hepatitis diagnosed as a child.  He was previously treated with 6 MP which was weaned off in in 03/2021.  Liver biopsy 07/2021 all showed steatohepatitis and stage I fibrosis, but there were no features of autoimmune hepatitis.    1. DANIELS (nonalcoholic steatohepatitis)        Recommendations:  - Counseled on diet, weight loss, and control of co-morbidities. Encouraged to lose 10% of his body weight over the next 12 months.  - Monitor CMP every 3 months    Return to clinic in 12 months with labs and FibroScan.    I spent a total of 30 minutes on the day of the visit. This includes face to face time and non-face to face time preparing to see  the patient (eg, review of tests), obtaining and/or reviewing separately obtained history, documenting clinical information in the electronic or other health record, independently interpreting results, and communicating results to the patient/family/caregiver, or care coordination.    Mitch Miller MD  Staff Physician  Hepatology and Liver Transplant  [facility]  [facility] [place]

## 2022-05-10 ENCOUNTER — LAB VISIT (OUTPATIENT)
Dept: LAB | Facility: HOSPITAL | Age: 22
End: 2022-05-10
Attending: STUDENT IN AN ORGANIZED HEALTH CARE EDUCATION/TRAINING PROGRAM
Payer: COMMERCIAL

## 2022-05-10 DIAGNOSIS — K75.81 NASH (NONALCOHOLIC STEATOHEPATITIS): ICD-10-CM

## 2022-05-10 LAB
ALBUMIN SERPL BCP-MCNC: 4.4 G/DL (ref 3.5–5.2)
ALP SERPL-CCNC: 54 U/L (ref 55–135)
ALT SERPL W/O P-5'-P-CCNC: 59 U/L (ref 10–44)
ANION GAP SERPL CALC-SCNC: 9 MMOL/L (ref 8–16)
AST SERPL-CCNC: 41 U/L (ref 10–40)
BILIRUB SERPL-MCNC: 1.3 MG/DL (ref 0.1–1)
BUN SERPL-MCNC: 14 MG/DL (ref 6–20)
CALCIUM SERPL-MCNC: 9.7 MG/DL (ref 8.7–10.5)
CHLORIDE SERPL-SCNC: 104 MMOL/L (ref 95–110)
CO2 SERPL-SCNC: 29 MMOL/L (ref 23–29)
CREAT SERPL-MCNC: 1.2 MG/DL (ref 0.5–1.4)
EST. GFR  (AFRICAN AMERICAN): >60 ML/MIN/1.73 M^2
EST. GFR  (NON AFRICAN AMERICAN): >60 ML/MIN/1.73 M^2
GLUCOSE SERPL-MCNC: 83 MG/DL (ref 70–110)
POTASSIUM SERPL-SCNC: 3.9 MMOL/L (ref 3.5–5.1)
PROT SERPL-MCNC: 7.2 G/DL (ref 6–8.4)
SODIUM SERPL-SCNC: 142 MMOL/L (ref 136–145)

## 2022-05-10 PROCEDURE — 80053 COMPREHEN METABOLIC PANEL: CPT | Performed by: STUDENT IN AN ORGANIZED HEALTH CARE EDUCATION/TRAINING PROGRAM

## 2022-05-10 PROCEDURE — 36415 COLL VENOUS BLD VENIPUNCTURE: CPT | Performed by: STUDENT IN AN ORGANIZED HEALTH CARE EDUCATION/TRAINING PROGRAM

## 2022-07-04 ENCOUNTER — HOSPITAL ENCOUNTER (EMERGENCY)
Facility: HOSPITAL | Age: 22
Discharge: HOME | End: 2022-07-05
Attending: EMERGENCY MEDICINE
Payer: COMMERCIAL

## 2022-07-04 VITALS
HEART RATE: 100 BPM | OXYGEN SATURATION: 100 % | TEMPERATURE: 99 F | SYSTOLIC BLOOD PRESSURE: 132 MMHG | RESPIRATION RATE: 16 BRPM | DIASTOLIC BLOOD PRESSURE: 74 MMHG

## 2022-07-04 DIAGNOSIS — R07.9 CHEST PAIN: ICD-10-CM

## 2022-07-04 DIAGNOSIS — R00.2 PALPITATIONS: Primary | ICD-10-CM

## 2022-07-04 LAB
ALBUMIN SERPL BCP-MCNC: 4.3 G/DL (ref 3.5–5.2)
ALP SERPL-CCNC: 67 U/L (ref 55–135)
ALT SERPL W/O P-5'-P-CCNC: 63 U/L (ref 10–44)
ANION GAP SERPL CALC-SCNC: 8 MMOL/L (ref 8–16)
AST SERPL-CCNC: 31 U/L (ref 10–40)
BASOPHILS # BLD AUTO: 0.05 K/UL (ref 0–0.2)
BASOPHILS NFR BLD: 0.6 % (ref 0–1.9)
BILIRUB SERPL-MCNC: 0.6 MG/DL (ref 0.1–1)
BUN SERPL-MCNC: 11 MG/DL (ref 6–20)
CALCIUM SERPL-MCNC: 9.8 MG/DL (ref 8.7–10.5)
CHLORIDE SERPL-SCNC: 104 MMOL/L (ref 95–110)
CO2 SERPL-SCNC: 27 MMOL/L (ref 23–29)
CREAT SERPL-MCNC: 1.1 MG/DL (ref 0.5–1.4)
DIFFERENTIAL METHOD: ABNORMAL
EOSINOPHIL # BLD AUTO: 0.2 K/UL (ref 0–0.5)
EOSINOPHIL NFR BLD: 2.2 % (ref 0–8)
ERYTHROCYTE [DISTWIDTH] IN BLOOD BY AUTOMATED COUNT: 12.5 % (ref 11.5–14.5)
EST. GFR  (AFRICAN AMERICAN): >60 ML/MIN/1.73 M^2
EST. GFR  (NON AFRICAN AMERICAN): >60 ML/MIN/1.73 M^2
GLUCOSE SERPL-MCNC: 103 MG/DL (ref 70–110)
HCT VFR BLD AUTO: 47.5 % (ref 40–54)
HGB BLD-MCNC: 16 G/DL (ref 14–18)
IMM GRANULOCYTES # BLD AUTO: 0.09 K/UL (ref 0–0.04)
IMM GRANULOCYTES NFR BLD AUTO: 1.1 % (ref 0–0.5)
LYMPHOCYTES # BLD AUTO: 2.5 K/UL (ref 1–4.8)
LYMPHOCYTES NFR BLD: 29.4 % (ref 18–48)
MAGNESIUM SERPL-MCNC: 2 MG/DL (ref 1.6–2.6)
MCH RBC QN AUTO: 30.5 PG (ref 27–31)
MCHC RBC AUTO-ENTMCNC: 33.7 G/DL (ref 32–36)
MCV RBC AUTO: 91 FL (ref 82–98)
MONOCYTES # BLD AUTO: 0.9 K/UL (ref 0.3–1)
MONOCYTES NFR BLD: 10.5 % (ref 4–15)
NEUTROPHILS # BLD AUTO: 4.8 K/UL (ref 1.8–7.7)
NEUTROPHILS NFR BLD: 56.2 % (ref 38–73)
NRBC BLD-RTO: 0 /100 WBC
PLATELET # BLD AUTO: 192 K/UL (ref 150–450)
PMV BLD AUTO: 11.1 FL (ref 9.2–12.9)
POTASSIUM SERPL-SCNC: 3.8 MMOL/L (ref 3.5–5.1)
PROT SERPL-MCNC: 7.3 G/DL (ref 6–8.4)
RBC # BLD AUTO: 5.25 M/UL (ref 4.6–6.2)
SODIUM SERPL-SCNC: 139 MMOL/L (ref 136–145)
TROPONIN I SERPL DL<=0.01 NG/ML-MCNC: <0.006 NG/ML (ref 0–0.03)
TSH SERPL DL<=0.005 MIU/L-ACNC: 0.97 UIU/ML (ref 0.4–4)
WBC # BLD AUTO: 8.49 K/UL (ref 3.9–12.7)

## 2022-07-04 PROCEDURE — 85025 COMPLETE CBC W/AUTO DIFF WBC: CPT | Performed by: PHYSICIAN ASSISTANT

## 2022-07-04 PROCEDURE — 80053 COMPREHEN METABOLIC PANEL: CPT | Performed by: PHYSICIAN ASSISTANT

## 2022-07-04 PROCEDURE — 93005 ELECTROCARDIOGRAM TRACING: CPT

## 2022-07-04 PROCEDURE — 63600175 PHARM REV CODE 636 W HCPCS: Performed by: PHYSICIAN ASSISTANT

## 2022-07-04 PROCEDURE — 99284 EMERGENCY DEPT VISIT MOD MDM: CPT | Mod: ,,, | Performed by: PHYSICIAN ASSISTANT

## 2022-07-04 PROCEDURE — 93010 EKG 12-LEAD: ICD-10-PCS | Mod: ,,, | Performed by: INTERNAL MEDICINE

## 2022-07-04 PROCEDURE — 83735 ASSAY OF MAGNESIUM: CPT | Performed by: PHYSICIAN ASSISTANT

## 2022-07-04 PROCEDURE — 99285 EMERGENCY DEPT VISIT HI MDM: CPT | Mod: 25

## 2022-07-04 PROCEDURE — 93010 ELECTROCARDIOGRAM REPORT: CPT | Mod: ,,, | Performed by: INTERNAL MEDICINE

## 2022-07-04 PROCEDURE — 96374 THER/PROPH/DIAG INJ IV PUSH: CPT

## 2022-07-04 PROCEDURE — 84484 ASSAY OF TROPONIN QUANT: CPT | Performed by: PHYSICIAN ASSISTANT

## 2022-07-04 PROCEDURE — 84443 ASSAY THYROID STIM HORMONE: CPT | Performed by: PHYSICIAN ASSISTANT

## 2022-07-04 PROCEDURE — 99284 PR EMERGENCY DEPT VISIT,LEVEL IV: ICD-10-PCS | Mod: ,,, | Performed by: PHYSICIAN ASSISTANT

## 2022-07-04 RX ORDER — KETOROLAC TROMETHAMINE 30 MG/ML
10 INJECTION, SOLUTION INTRAMUSCULAR; INTRAVENOUS
Status: COMPLETED | OUTPATIENT
Start: 2022-07-04 | End: 2022-07-04

## 2022-07-04 RX ADMIN — KETOROLAC TROMETHAMINE 10 MG: 30 INJECTION, SOLUTION INTRAMUSCULAR; INTRAVENOUS at 11:07

## 2022-07-05 ENCOUNTER — TELEPHONE (OUTPATIENT)
Dept: INFUSION THERAPY | Facility: HOSPITAL | Age: 22
End: 2022-07-05
Payer: COMMERCIAL

## 2022-07-05 ENCOUNTER — TELEPHONE (OUTPATIENT)
Dept: ADMINISTRATIVE | Facility: OTHER | Age: 22
End: 2022-07-05
Payer: COMMERCIAL

## 2022-07-05 ENCOUNTER — TELEPHONE (OUTPATIENT)
Dept: HEMATOLOGY/ONCOLOGY | Facility: CLINIC | Age: 22
End: 2022-07-05
Payer: COMMERCIAL

## 2022-07-05 NOTE — TELEPHONE ENCOUNTER
Spoke with the patient's grandmother. Explained that Dr. Jain does not see NET patients and is not accepting new patients at this time.

## 2022-07-05 NOTE — ED PROVIDER NOTES
Encounter Date: 7/4/2022       History     Chief Complaint   Patient presents with    Chest Pain     Pt c/o L sided chest pressure radiating to L arm x3 weeks. -SOB.      HPI  Review of patient's allergies indicates:   Allergen Reactions    Tylenol [acetaminophen]      Patient has Autoimmune Hepatitis/Takes 6MP    Latex, natural rubber Rash     Past Medical History:   Diagnosis Date    Autoimmune hepatitis     Family history of carcinoid tumor 11/1/2017    Fever blister     Fibrosis of liver     History of malignant neuroendocrine tumor 8/21/2020    IgA deficiency, selective     New onset of headaches 2/12/2018    Pancreatic cyst 5/7/2018    Regional lymph node metastasis present 11/1/2017     Past Surgical History:   Procedure Laterality Date    CIRCUMCISION      LIVER BIOPSY  3/15/2013    PANCREATECTOMY      TONSILLECTOMY, ADENOIDECTOMY       Family History   Problem Relation Age of Onset    No Known Problems Mother     No Known Problems Father     Cancer Maternal Grandfather         unknown    No Known Problems Brother     Breast cancer Paternal Aunt     Cancer Paternal Aunt         breast (BRCA2)    Heart disease Maternal Grandmother     Hypertension Maternal Grandmother     Diabetes Maternal Grandmother     No Known Problems Paternal Grandmother     Cancer Paternal Grandfather         Small intestine carcinoid    Colon polyps Paternal Grandfather     No Known Problems Brother     Cancer Maternal Aunt         breast    Melanoma Neg Hx     Lupus Neg Hx     Psoriasis Neg Hx     Congenital heart disease Neg Hx     Pacemaker/defibrilator Neg Hx     Early death Neg Hx      Social History     Tobacco Use    Smoking status: Never Smoker    Smokeless tobacco: Never Used   Substance Use Topics    Alcohol use: No    Drug use: No     Review of Systems    Physical Exam     Initial Vitals [07/04/22 2128]   BP Pulse Resp Temp SpO2   132/74 100 16 98.5 °F (36.9 °C) 100 %      MAP        --         Physical Exam    ED Course   Procedures  Labs Reviewed   CBC W/ AUTO DIFFERENTIAL - Abnormal; Notable for the following components:       Result Value    Immature Granulocytes 1.1 (*)     Immature Grans (Abs) 0.09 (*)     All other components within normal limits   COMPREHENSIVE METABOLIC PANEL - Abnormal; Notable for the following components:    ALT 63 (*)     All other components within normal limits   TROPONIN I   TSH   MAGNESIUM        ECG Results          EKG 12-lead (Final result)  Result time 07/04/22 22:38:46    Final result by Interface, Lab In [facility] (07/04/22 22:38:46)                 Narrative:    Test Reason : R07.9,    Vent. Rate : 091 BPM     Atrial Rate : 091 BPM     P-R Int : 164 ms          QRS Dur : 090 ms      QT Int : 348 ms       P-R-T Axes : 058 034 047 degrees     QTc Int : 428 ms    Normal sinus rhythm with sinus arrhythmia  Normal ECG  When compared with ECG of 20-MAY-2019 03:48,  Incomplete right bundle branch block is no longer Present  Confirmed by TEODORO HERNANDEZ MD ([id number]) on 7/4/2022 10:38:41 PM    Referred By: ELINA   SELF           Confirmed By:TEODORO HERNANDEZ MD                            Imaging Results          X-Ray Chest PA And Lateral (Final result)  Result time 07/04/22 23:00:34    Final result by Jagdish Hernandez MD (07/04/22 23:00:34)                 Impression:      No acute cardiopulmonary process.      Electronically signed by: Jagdish Hernandez MD  Date:    07/04/2022  Time:    23:00             Narrative:    EXAMINATION:  XR CHEST PA AND LATERAL    CLINICAL HISTORY:  Chest pain, unspecified    TECHNIQUE:  PA and lateral views of the chest were performed.    COMPARISON:  07/15/2020.    FINDINGS:  There is no consolidation, effusion, or pneumothorax.    Cardiomediastinal silhouette is unremarkable.    Regional osseous structures are unremarkable.                                 Medications   ketorolac injection 9.999 mg (9.999 mg Intravenous Given 7/4/22 9206)                 Attending Attestation:     Physician Attestation Statement for NP/PA:   I discussed this assessment and plan of this patient with the NP/PA, but I did not personally examine the patient. The face to face encounter was performed by the NP/PA.                       Clinical Impression:   Final diagnoses:  [R07.9] Chest pain  [R00.2] Palpitations (Primary)          ED Disposition Condition    Discharge Stable        ED Prescriptions     None        Follow-up Information     Follow up With Specialties Details Why Contact Info Additional Information    Saad Esparza - Cardiology - [facility] Cardiology Schedule an appointment as soon as possible for a visit   [address]  [facility] [id number] [phone] Cardiology Services Clinics - 3rd floor           Remy Mustafa MD  07/05/22 1229       Remy Mustafa MD  07/05/22 6332

## 2022-07-05 NOTE — TELEPHONE ENCOUNTER
----- Message from Stacey Saldivar sent at 7/2/2022  9:16 AM CDT -----  Type:  Sooner Apoointment Request    Caller is requesting a sooner appointment.  Caller declined first available appointment listed below.  Caller will not accept being placed on the waitlist and is requesting a message be sent to doctor.  Name of Caller:Pt grandma  When is the first available appointment? 9/12/2022  Symptoms; check up  Would the patient rather a call back or a response via [facility]? call  Best Call Back Number:[phone]  Additional Information: n/a

## 2022-07-05 NOTE — ED PROVIDER NOTES
Encounter Date: 7/4/2022       History     Chief Complaint   Patient presents with    Chest Pain     Pt c/o L sided chest pressure radiating to L arm x3 weeks. -SOB.      22-year-old male with history of carcinoid tumor, autoimmune hepatitis presents to the emergency department for chest pain.  Patient reports left-sided chest tightness which has been intermittent for the past 3 weeks states that today it started at around 3:00 p.m. rates it a 5/10 and states that it radiates to his left upper arm.  States that this is not related with exertion and denies any aggravating or alleviating factors.  Also states that he notices when he sneezes has a shooting pain down his left arm.  Patient denies any shortness of breath and notes that occasionally he does feel palpitations with these episodes.        Review of patient's allergies indicates:   Allergen Reactions    Tylenol [acetaminophen]      Patient has Autoimmune Hepatitis/Takes 6MP    Latex, natural rubber Rash     Past Medical History:   Diagnosis Date    Autoimmune hepatitis     Family history of carcinoid tumor 11/1/2017    Fever blister     Fibrosis of liver     History of malignant neuroendocrine tumor 8/21/2020    IgA deficiency, selective     New onset of headaches 2/12/2018    Pancreatic cyst 5/7/2018    Regional lymph node metastasis present 11/1/2017     Past Surgical History:   Procedure Laterality Date    CIRCUMCISION      LIVER BIOPSY  3/15/2013    PANCREATECTOMY      TONSILLECTOMY, ADENOIDECTOMY       Family History   Problem Relation Age of Onset    No Known Problems Mother     No Known Problems Father     Cancer Maternal Grandfather         unknown    No Known Problems Brother     Breast cancer Paternal Aunt     Cancer Paternal Aunt         breast (BRCA2)    Heart disease Maternal Grandmother     Hypertension Maternal Grandmother     Diabetes Maternal Grandmother     No Known Problems Paternal Grandmother     Cancer Paternal  Grandfather         Small intestine carcinoid    Colon polyps Paternal Grandfather     No Known Problems Brother     Cancer Maternal Aunt         breast    Melanoma Neg Hx     Lupus Neg Hx     Psoriasis Neg Hx     Congenital heart disease Neg Hx     Pacemaker/defibrilator Neg Hx     Early death Neg Hx      Social History     Tobacco Use    Smoking status: Never Smoker    Smokeless tobacco: Never Used   Substance Use Topics    Alcohol use: No    Drug use: No     Review of Systems   Constitutional: Negative for chills and fever.   HENT: Negative for sore throat.    Respiratory: Negative for cough and shortness of breath.    Cardiovascular: Positive for chest pain.   Gastrointestinal: Negative for abdominal pain, nausea and vomiting.   Genitourinary: Negative for difficulty urinating and dysuria.   Musculoskeletal: Negative.    Skin: Negative.    Neurological: Negative for weakness and headaches.   Psychiatric/Behavioral: Negative for confusion.       Physical Exam     Initial Vitals [07/04/22 2128]   BP Pulse Resp Temp SpO2   132/74 100 16 98.5 °F (36.9 °C) 100 %      MAP       --         Physical Exam    Nursing note and vitals reviewed.  Constitutional: He appears well-developed and well-nourished. He is not diaphoretic. No distress.   HENT:   Head: Normocephalic and atraumatic.   Eyes: Conjunctivae are normal. Pupils are equal, round, and reactive to light.   Neck: Neck supple.   Normal range of motion.  Cardiovascular: Normal rate, regular rhythm, normal heart sounds and intact distal pulses. Exam reveals no gallop and no friction rub.    No murmur heard.  Pulmonary/Chest: Breath sounds normal.   Abdominal: Abdomen is soft. Bowel sounds are normal.   Musculoskeletal:         General: Normal range of motion.      Cervical back: Normal range of motion and neck supple.     Neurological: He is alert and oriented to person, place, and time. GCS score is 15. GCS eye subscore is 4. GCS verbal subscore is 5.  GCS motor subscore is 6.   Skin: Skin is warm and dry. Capillary refill takes less than 2 seconds.   Psychiatric: He has a normal mood and affect.         ED Course   Procedures  Labs Reviewed   CBC W/ AUTO DIFFERENTIAL - Abnormal; Notable for the following components:       Result Value    Immature Granulocytes 1.1 (*)     Immature Grans (Abs) 0.09 (*)     All other components within normal limits   COMPREHENSIVE METABOLIC PANEL - Abnormal; Notable for the following components:    ALT 63 (*)     All other components within normal limits   TROPONIN I   TSH   MAGNESIUM        ECG Results          EKG 12-lead (Final result)  Result time 07/04/22 22:38:46    Final result by Interface, Lab In [facility] (07/04/22 22:38:46)                 Narrative:    Test Reason : R07.9,    Vent. Rate : 091 BPM     Atrial Rate : 091 BPM     P-R Int : 164 ms          QRS Dur : 090 ms      QT Int : 348 ms       P-R-T Axes : 058 034 047 degrees     QTc Int : 428 ms    Normal sinus rhythm with sinus arrhythmia  Normal ECG  When compared with ECG of 20-MAY-2019 03:48,  Incomplete right bundle branch block is no longer Present  Confirmed by TEODORO HERNANDEZ MD ([id number]) on 7/4/2022 10:38:41 PM    Referred By: [email or web address]   SELF           Confirmed By:TEODORO HERNANDEZ MD                            Imaging Results          X-Ray Chest PA And Lateral (Final result)  Result time 07/04/22 23:00:34    Final result by Jagdish Hernandez MD (07/04/22 23:00:34)                 Impression:      No acute cardiopulmonary process.      Electronically signed by: Jagdish Hernandez MD  Date:    07/04/2022  Time:    23:00             Narrative:    EXAMINATION:  XR CHEST PA AND LATERAL    CLINICAL HISTORY:  Chest pain, unspecified    TECHNIQUE:  PA and lateral views of the chest were performed.    COMPARISON:  07/15/2020.    FINDINGS:  There is no consolidation, effusion, or pneumothorax.    Cardiomediastinal silhouette is unremarkable.    Regional osseous structures are  unremarkable.                                 Medications   ketorolac injection 9.999 mg (9.999 mg Intravenous Given 7/4/22 6131)     Medical Decision Making:   History:   Old Medical Records: I decided to obtain old medical records.  Clinical Tests:   Lab Tests: Ordered and Reviewed  Radiological Study: Ordered and Reviewed  Medical Tests: Ordered and Reviewed    Additional MDM:     Well's Criteria Score:  -Clinical symptoms of DVT (leg swelling, pain with palpation) = 0.0  -Other diagnosis less likely than pulmonary embolism =            0.0  -Heart Rate >100 =   0.0  -Immobilization (= or > than 3 days) or surgery in the previous 4 weeks = 0.0  -Previous DVT/PE = 0.0  -Hemoptysis =          0.0  -Malignancy =           0.0  Well's Probability Score =    0          APC / Resident Notes:   22-year-old male presents to the ED for left-sided chest pain that radiates to the left arm.  Reports that this has been an intermittent issue for the past 3 weeks and is progressively worsening.  Describes the pain as a chest tightness with no associated shortness of breath.  Does report occasional episodes of palpitations during the chest pain episodes.    On exam no tenderness to the chest wall.  Lungs clear to auscultation bilaterally.  Heart sounds with S1-S2 with no murmurs rubs or gallops.    Differential includes but not limited to ACS, chest wall pain, DVT, atypical chest pain    ED course:  EKG normal sinus rhythm with no ischemic changes.  Troponin negative.  Chest x-ray with no acute cardiopulmonary abnormalities.  Will score 0 and low suspicion for PE.  TSH within normal limits.  Electrolytes are unremarkable.  Patient has a unrevealing workup I do not suspect any emergent causes of chest pain at this time.  Patient is well-appearing with reassuring vital signs.  Will discharge with a outpatient follow-up with Cardiology due to his palpitations with chest pain.  Discussed return precautions for any new worsening  symptoms.                 Clinical Impression:   Final diagnoses:  [R07.9] Chest pain  [R00.2] Palpitations (Primary)          ED Disposition Condition    Discharge Stable        ED Prescriptions     None        Follow-up Information     Follow up With Specialties Details Why Contact Info Additional Information    Saad Esparza - Cardiology - 3rd Fl Cardiology Schedule an appointment as soon as possible for a visit   [address]  [facility] [id number]  [phone] Cardiology Services Clinics - 3rd floor           Dewayne Melissa PA-C  07/05/22 0130

## 2022-07-05 NOTE — PROVIDER PROGRESS NOTES - EMERGENCY DEPT.
Encounter Date: 7/4/2022    ED Physician Progress Notes         EKG - STEMI Decision  Initial Reading: No STEMI present.  Response: No Action Needed.

## 2022-07-05 NOTE — ED TRIAGE NOTES
Homer Beatty, a 22 y.o. male presents to the ED w/ complaint of left anterior chest pain that radiates to left arm for the past 3 weeks. Reports cough. Denies SOB/n/v/d.    Triage note:  Chief Complaint   Patient presents with    Chest Pain     Pt c/o L sided chest pressure radiating to L arm x3 weeks. -SOB.      Review of patient's allergies indicates:   Allergen Reactions    Tylenol [acetaminophen]      Patient has Autoimmune Hepatitis/Takes 6MP    Latex, natural rubber Rash     Past Medical History:   Diagnosis Date    Autoimmune hepatitis     Family history of carcinoid tumor 11/1/2017    Fever blister     Fibrosis of liver     History of malignant neuroendocrine tumor 8/21/2020    IgA deficiency, selective     New onset of headaches 2/12/2018    Pancreatic cyst 5/7/2018    Regional lymph node metastasis present 11/1/2017     Patient identifiers verified and correct for Homer Beatty    LOC: The patient is awake, alert, and aware of environment. The patient is AOX4 and speaking appropriately.   APPEARANCE: No acute distress noted.   HEENT: WDL, PERRLA  PSYCHOSOCIAL: Patient is calm and cooperative. Denies SI/HI.  SKIN: The skin is warm, dry, color consistent with ethnicity. No breakdown or brusing visible.  RESPIRATORY: Airway is open and patent. Bilateral chest rise and fall. Respiratory rate even and unlabored.  No accessory muscle use noted.  CARDIAC: Patient has a normal rate and rhythm. Reports left anterior chest pain that radiates to left arm.  ABDOMEN/GI: Soft, non tender. No distention noted. Denies n/v/d.   URINARY:  Voids independently without difficulty. No complaints of frequency, urgency, burning, or blood in urine.   NEUROLOGIC: Eyes open spontaneously. Speech clear.  Able to follow commands, demonstrating ability to actively and appropriately communicate within context of current conversation. Symmetrical facial muscles. Movement is purposeful. Denies  dizziness/lightheadedness.  MUSCULOSKELETAL: No obvious deformities noted. Full ROM in all extremities.  PERIPHERAL VASCULAR: Cap refill <3 secs bilaterally. No peripheral edema noted. Denies numbness and tingling in extremities.

## 2022-07-05 NOTE — TELEPHONE ENCOUNTER
----- Message from Roseanne Gooden sent at 7/5/2022  8:49 AM CDT -----  Contact: Viky(Grandmother)-[phone]  Type:  Needs Medical Advice    Who Called: Pt's grandmother   Reason for call: regarding scheduling a New pt appt, pt was a pt of Dr Hanson  Would the patient rather a call back or a response via [facility]? Call back  Best Call Back Number: [phone]

## 2022-07-18 ENCOUNTER — OFFICE VISIT (OUTPATIENT)
Dept: CARDIOLOGY | Facility: CLINIC | Age: 22
End: 2022-07-18
Payer: COMMERCIAL

## 2022-07-18 VITALS
WEIGHT: 282.31 LBS | HEIGHT: 73 IN | BODY MASS INDEX: 37.41 KG/M2 | DIASTOLIC BLOOD PRESSURE: 72 MMHG | HEART RATE: 70 BPM | SYSTOLIC BLOOD PRESSURE: 128 MMHG | OXYGEN SATURATION: 97 %

## 2022-07-18 DIAGNOSIS — R00.2 PALPITATIONS: ICD-10-CM

## 2022-07-18 DIAGNOSIS — E66.09 CLASS 2 OBESITY DUE TO EXCESS CALORIES WITHOUT SERIOUS COMORBIDITY WITH BODY MASS INDEX (BMI) OF 37.0 TO 37.9 IN ADULT: ICD-10-CM

## 2022-07-18 DIAGNOSIS — I20.89 OTHER FORMS OF ANGINA PECTORIS: ICD-10-CM

## 2022-07-18 DIAGNOSIS — D3A.8 NEUROENDOCRINE TUMOR OF PANCREAS: ICD-10-CM

## 2022-07-18 DIAGNOSIS — K75.81 NASH (NONALCOHOLIC STEATOHEPATITIS): ICD-10-CM

## 2022-07-18 DIAGNOSIS — R07.89 ATYPICAL CHEST PAIN: ICD-10-CM

## 2022-07-18 PROBLEM — E66.812 CLASS 2 OBESITY DUE TO EXCESS CALORIES WITHOUT SERIOUS COMORBIDITY WITH BODY MASS INDEX (BMI) OF 37.0 TO 37.9 IN ADULT: Status: ACTIVE | Noted: 2021-08-12

## 2022-07-18 PROCEDURE — 3008F BODY MASS INDEX DOCD: CPT | Mod: CPTII,S$GLB,, | Performed by: INTERNAL MEDICINE

## 2022-07-18 PROCEDURE — 3074F SYST BP LT 130 MM HG: CPT | Mod: CPTII,S$GLB,, | Performed by: INTERNAL MEDICINE

## 2022-07-18 PROCEDURE — 99204 PR OFFICE/OUTPT VISIT, NEW, LEVL IV, 45-59 MIN: ICD-10-PCS | Mod: S$GLB,,, | Performed by: INTERNAL MEDICINE

## 2022-07-18 PROCEDURE — 3078F PR MOST RECENT DIASTOLIC BLOOD PRESSURE < 80 MM HG: ICD-10-PCS | Mod: CPTII,S$GLB,, | Performed by: INTERNAL MEDICINE

## 2022-07-18 PROCEDURE — 99204 OFFICE O/P NEW MOD 45 MIN: CPT | Mod: S$GLB,,, | Performed by: INTERNAL MEDICINE

## 2022-07-18 PROCEDURE — 99999 PR PBB SHADOW E&M-EST. PATIENT-LVL IV: ICD-10-PCS | Mod: PBBFAC,,, | Performed by: INTERNAL MEDICINE

## 2022-07-18 PROCEDURE — 1160F PR REVIEW ALL MEDS BY PRESCRIBER/CLIN PHARMACIST DOCUMENTED: ICD-10-PCS | Mod: CPTII,S$GLB,, | Performed by: INTERNAL MEDICINE

## 2022-07-18 PROCEDURE — 3074F PR MOST RECENT SYSTOLIC BLOOD PRESSURE < 130 MM HG: ICD-10-PCS | Mod: CPTII,S$GLB,, | Performed by: INTERNAL MEDICINE

## 2022-07-18 PROCEDURE — 1159F MED LIST DOCD IN RCRD: CPT | Mod: CPTII,S$GLB,, | Performed by: INTERNAL MEDICINE

## 2022-07-18 PROCEDURE — 1159F PR MEDICATION LIST DOCUMENTED IN MEDICAL RECORD: ICD-10-PCS | Mod: CPTII,S$GLB,, | Performed by: INTERNAL MEDICINE

## 2022-07-18 PROCEDURE — 99999 PR PBB SHADOW E&M-EST. PATIENT-LVL IV: CPT | Mod: PBBFAC,,, | Performed by: INTERNAL MEDICINE

## 2022-07-18 PROCEDURE — 3078F DIAST BP <80 MM HG: CPT | Mod: CPTII,S$GLB,, | Performed by: INTERNAL MEDICINE

## 2022-07-18 PROCEDURE — 1160F RVW MEDS BY RX/DR IN RCRD: CPT | Mod: CPTII,S$GLB,, | Performed by: INTERNAL MEDICINE

## 2022-07-18 PROCEDURE — 3008F PR BODY MASS INDEX (BMI) DOCUMENTED: ICD-10-PCS | Mod: CPTII,S$GLB,, | Performed by: INTERNAL MEDICINE

## 2022-07-18 NOTE — ASSESSMENT & PLAN NOTE
Likely MSK.  Pt w/o cardiac risk factors and HPI negative for typical chest pain.    - check ETT  - if cardiac w/u negative pt to f/u w/ his PCP for a noncardiac cp w/u

## 2022-07-18 NOTE — PROGRESS NOTES
Subjective:    Patient ID:  Homer Beatty is a 22 y.o. male who presents for evaluation of Chest Pain      PCP: Torsten Renteria MD     HPI  Pt is a 23 yo F w/o significant PMH who presents for evaluation of atypical chest pain x4 weeks.  He mentions that he has been having a L sided cp which is constant however waxes and wanes w/ its severity w/ radiates to his L shoulder.  He describes the pain as sharp and may be worse at night.  He states he exercises by walking and denies limitations.  He notes the pain is present by not worsened by exertion.  He presented to the ED 7/5/2022 for this cp and had a negative w/u and was referred to cardiology.  He denies sob, edema, orthopnea, PND, presyncope, LOC, palpitations, and claudication.  He does not monitor his BP at home.         Past Medical History:   Diagnosis Date    Autoimmune hepatitis     Family history of carcinoid tumor 11/1/2017    Fever blister     Fibrosis of liver     History of malignant neuroendocrine tumor 8/21/2020    IgA deficiency, selective     New onset of headaches 2/12/2018    Pancreatic cyst 5/7/2018    Regional lymph node metastasis present 11/1/2017     Past Surgical History:   Procedure Laterality Date    CIRCUMCISION      LIVER BIOPSY  3/15/2013    PANCREATECTOMY      TONSILLECTOMY, ADENOIDECTOMY       Social History     Socioeconomic History    Marital status: Single   Tobacco Use    Smoking status: Never Smoker    Smokeless tobacco: Never Used   Substance and Sexual Activity    Alcohol use: No    Drug use: No    Sexual activity: Never   Social History Narrative    2 cats and a dog        Lives with mom (Viky), and 2  Brother.  Also goes to parents.        Attends Brother [employer] 9th grade        Mother smokes.     Family History   Problem Relation Age of Onset    No Known Problems Mother     No Known Problems Father     Cancer Maternal Grandfather         unknown    No Known Problems Brother     Breast  "cancer Paternal Aunt     Cancer Paternal Aunt         breast (BRCA2)    Heart disease Maternal Grandmother     Hypertension Maternal Grandmother     Diabetes Maternal Grandmother     No Known Problems Paternal Grandmother     Cancer Paternal Grandfather         Small intestine carcinoid    Colon polyps Paternal Grandfather     No Known Problems Brother     Cancer Maternal Aunt         breast    Melanoma Neg Hx     Lupus Neg Hx     Psoriasis Neg Hx     Congenital heart disease Neg Hx     Pacemaker/defibrilator Neg Hx     Early death Neg Hx        Review of patient's allergies indicates:   Allergen Reactions    Tylenol [acetaminophen]      Patient has Autoimmune Hepatitis/Takes 6MP    Latex, natural rubber Rash       Medication List with Changes/Refills   Current Medications    FISH OIL-OMEGA-3 FATTY ACIDS 300-1,000 MG CAPSULE    Take 2 g by mouth once daily.       Review of Systems   Constitutional: Negative for diaphoresis and fever.   HENT: Negative for congestion and hearing loss.    Eyes: Negative for blurred vision and pain.   Cardiovascular: Positive for chest pain. Negative for claudication, dyspnea on exertion, leg swelling, near-syncope, palpitations and syncope.   Respiratory: Negative for shortness of breath and sleep disturbances due to breathing.    Hematologic/Lymphatic: Negative for bleeding problem. Does not bruise/bleed easily.   Skin: Negative for color change and poor wound healing.   Gastrointestinal: Negative for abdominal pain and nausea.   Genitourinary: Negative for bladder incontinence and flank pain.   Neurological: Negative for focal weakness and light-headedness.        Objective:   /72   Pulse 70   Ht 6' 1" (1.854 m)   Wt 128.1 kg (282 lb 4.8 oz)   SpO2 97%   BMI 37.24 kg/m²    Physical Exam  Constitutional:       Appearance: He is well-developed. He is not diaphoretic.   HENT:      Head: Normocephalic and atraumatic.   Eyes:      General: No scleral icterus.   "   Pupils: Pupils are equal, round, and reactive to light.   Neck:      Vascular: No JVD.   Cardiovascular:      Rate and Rhythm: Normal rate and regular rhythm.      Pulses: Intact distal pulses.      Heart sounds: S1 normal and S2 normal. No murmur heard.    No friction rub. No gallop.   Pulmonary:      Effort: Pulmonary effort is normal. No respiratory distress.      Breath sounds: Normal breath sounds. No wheezing or rales.   Chest:      Chest wall: No tenderness.   Abdominal:      General: Bowel sounds are normal. There is no distension.      Palpations: Abdomen is soft. There is no mass.      Tenderness: There is no abdominal tenderness. There is no rebound.   Musculoskeletal:         General: No tenderness. Normal range of motion.      Cervical back: Normal range of motion and neck supple.   Skin:     General: Skin is warm and dry.      Coloration: Skin is not pale.   Neurological:      Mental Status: He is alert and oriented to person, place, and time.      Coordination: Coordination normal.      Deep Tendon Reflexes: Reflexes normal.   Psychiatric:         Behavior: Behavior normal.         Judgment: Judgment normal.           EC2022- reviewed. NSR w/ sinus arrhythmia , NL ECG.     Assessment:       1. Chest pain    2. Palpitations    3. Atypical chest pain    4. Neuroendocrine tumor of pancreas    5. DANIELS (nonalcoholic steatohepatitis)    6. Class 2 obesity due to excess calories without serious comorbidity with body mass index (BMI) of 37.0 to 37.9 in adult         Plan:         Atypical chest pain  Likely MSK.  Pt w/o cardiac risk factors and HPI negative for typical chest pain.    - check ETT  - if cardiac w/u negative pt to f/u w/ his PCP for a noncardiac cp w/u    Neuroendocrine tumor of pancreas  Followed by endocrine.      DANILES (nonalcoholic steatohepatitis)  Followed by hepatology.      Class 2 obesity due to excess calories without serious comorbidity with body mass index (BMI) of 37.0 to  37.9 in adult  Encouraged lifestyle modifications (diet, exercise, and weight loss).        Total duration of face to face visit time 30 minutes.  Total time spent counseling greater than fifty percent of total visit time.  Counseling included discussion regarding imaging findings, diagnosis, possibilities, treatment options, risks and benefits.  The patient had many questions regarding the options and long-term effects      Moises Payton M.D.  Interventional Cardiology

## 2022-08-01 NOTE — TELEPHONE ENCOUNTER
Called to confirm patient's appointment with Dr. Mcnamara on 5/23/2018 at 3 pm. No answer. Left voicemail message with appointment information.     Rituxan Pregnancy And Lactation Text: This medication is Pregnancy Category C and it isn't know if it is safe during pregnancy. It is unknown if this medication is excreted in breast milk but similar antibodies are known to be excreted.

## 2022-08-04 ENCOUNTER — HOSPITAL ENCOUNTER (OUTPATIENT)
Dept: RADIOLOGY | Facility: HOSPITAL | Age: 22
Discharge: HOME | End: 2022-08-04
Attending: SURGERY
Payer: COMMERCIAL

## 2022-08-04 DIAGNOSIS — D3A.8 NEUROENDOCRINE TUMOR OF PANCREAS: ICD-10-CM

## 2022-08-04 DIAGNOSIS — C77.9 REGIONAL LYMPH NODE METASTASIS PRESENT: ICD-10-CM

## 2022-08-04 DIAGNOSIS — Z12.89 ENCOUNTER FOR SCREENING FOR MALIGNANT NEOPLASM OF OTHER SITES: ICD-10-CM

## 2022-08-04 DIAGNOSIS — K75.4 AUTOIMMUNE HEPATITIS: ICD-10-CM

## 2022-08-04 PROCEDURE — 25500020 PHARM REV CODE 255: Performed by: SURGERY

## 2022-08-04 PROCEDURE — A9585 GADOBUTROL INJECTION: HCPCS | Performed by: SURGERY

## 2022-08-04 PROCEDURE — 74183 MRI ABD W/O CNTR FLWD CNTR: CPT | Mod: 26,,, | Performed by: RADIOLOGY

## 2022-08-04 PROCEDURE — 74183 MRI ABD W/O CNTR FLWD CNTR: CPT | Mod: TC

## 2022-08-04 PROCEDURE — 74183 MRI ABDOMEN W WO CONTRAST: ICD-10-PCS | Mod: 26,,, | Performed by: RADIOLOGY

## 2022-08-04 RX ORDER — GADOBUTROL 604.72 MG/ML
10 INJECTION INTRAVENOUS
Status: COMPLETED | OUTPATIENT
Start: 2022-08-04 | End: 2022-08-04

## 2022-08-04 RX ADMIN — GADOBUTROL 10 ML: 604.72 INJECTION INTRAVENOUS at 10:08

## 2022-09-12 NOTE — PROGRESS NOTES
PROGRESS NOTE    Subjective:       Patient ID: Homer Beatty is a 22 y.o. male.    Chief Complaint: resected pancreatic NET    Diagnosis:   Well differentiated low grade pancreatic NET    Oncologic History:       Oncologic History Pancreatic neuroendocrine tumor diagnosed 8/2017     Oncologic Treatment Distal pancreatectomy 10/17/17 (Dr. Aguilar)     Pathology Well differentiated neuroendocrine tumor of the pancreas, grade 1, Ki-67 less than 1%, pT2, N1, M0      Subjective:    Interval History:   Mr Beatty returns for follow up. 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  Further workup ensued leading to abdominal imaging which had revealed a 4.5 cm focal area of fullness in the tail of the pancreas.  An endoscopic ultrasound was performed revealing a low-grade neuroendocrine tumor.  There is no evidence of distant disease on imaging.  In 10/2017 he underwent a distal pancreatectomy.  Pathology had revealed a well-differentiated neuroendocrine tumor, grade 1 with negative margins.  The tumor was 5.1 cm with mitoses less than 2/10 HPF and Ki-67 less than 1%.  Necrosis was not identified nor was lymphovascular or perineural invasion.  2 out of 4 lymph nodes were positive for metastatic neuroendocrine tumor.  He also underwent a liver biopsy at that time showing mild macrosteatosis without evidence of hepatocellular damage or metastatic disease.  Of note he has a history of autoimmune hepatitis diagnosed at approximately age 6 and also a family history of a carcinoid tumor in his paternal grandfather.  Gallium-68 PET/CT in 11/2017 was negative for distant disease.  MEN-1 and VHL testing were negative.    Mr Beatty returns for follow up. He is feeling well. He noticed low back pain radiating down his legs for one month and is concerned about  it. Chronic redness of skin even when he stayed indoor. He [place] [employer] [place] lot.     ECO    ROS:   A ten-point system review is obtained and negative except for what was stated in the Interval History.     Physical Examination:   Vital signs reviewed.   General: well hydrated, well developed, in no acute distress  HEENT: normocephalic, PERRLA, EOMI, anicteric sclerae, oropharynx clear. +facial redness  Neck: supple, no JVD, thyromegaly, cervical or supraclavicular lymphadenopathy  Lungs: clear breath sounds bilaterally, no wheezing, rales, or rhonchi  Heart: RRR, no M/R/G  Abdomen: soft, no tenderness, non-distended, no hepatosplenomegaly, mass, or hernia. BS present  Extremities: no clubbing, cyanosis, or edema  Skin: generalized redness over face, neck, abdomen, bilateral arms and legs. On compression skin color returns to normal. No ulcer, or open wounds  Neuro: alert and oriented x 4, no focal neuro deficit  Psych: pleasant and appropriate mood and affect    Objective:     Laboratory Data:  Labs reviewed. CBC in 2022 normal    Imaging Data:  MRI abdomen 22:  Impression:     Normal MRI.  No significant abnormality seen.     Hepatosplenomegaly.       Assessment and Plan:     1. Neuroendocrine tumor of pancreas    2. Redness of skin    3. Chronic low back pain with sciatica, sciatica laterality unspecified, unspecified back pain laterality    4. Dorsalgia, unspecified      1.  - Mr Beatty is a 23 yo man with pathologic T2N1 well differentiated low grade NET s/p distal pancreatectomy in 10/2017  - doing well. Reviewed MRI abdomen results. YUDI  - continue with surveillance. Return in one year with MRI abdomen/pelvis    2.  - chronic. Generalized redness of skin even when he stays indoor  - check CBC, ferritin, iron/TIBC and HFE gene to r/o hemochromatosis  - refer to dermatology. May be a benign skin condition    3.4  - will get MRI lumbar spine for further evaluation of lumbago    Follow-up:      RTC in one year with MRI A/P  Knows to call in the interval if any problems arise.    Electronically signed by Verena Gilmore

## 2022-09-15 ENCOUNTER — LAB VISIT (OUTPATIENT)
Dept: LAB | Facility: HOSPITAL | Age: 22
End: 2022-09-15
Attending: INTERNAL MEDICINE
Payer: COMMERCIAL

## 2022-09-15 ENCOUNTER — OFFICE VISIT (OUTPATIENT)
Dept: HEMATOLOGY/ONCOLOGY | Facility: CLINIC | Age: 22
End: 2022-09-15
Payer: COMMERCIAL

## 2022-09-15 VITALS
BODY MASS INDEX: 36.01 KG/M2 | WEIGHT: 272.94 LBS | DIASTOLIC BLOOD PRESSURE: 73 MMHG | OXYGEN SATURATION: 98 % | SYSTOLIC BLOOD PRESSURE: 118 MMHG | TEMPERATURE: 97 F | HEART RATE: 72 BPM | RESPIRATION RATE: 18 BRPM

## 2022-09-15 DIAGNOSIS — L53.9 REDNESS OF SKIN: ICD-10-CM

## 2022-09-15 DIAGNOSIS — D3A.8 NEUROENDOCRINE TUMOR OF PANCREAS: Primary | ICD-10-CM

## 2022-09-15 DIAGNOSIS — M54.9 DORSALGIA, UNSPECIFIED: ICD-10-CM

## 2022-09-15 DIAGNOSIS — G89.29 CHRONIC LOW BACK PAIN WITH SCIATICA, SCIATICA LATERALITY UNSPECIFIED, UNSPECIFIED BACK PAIN LATERALITY: ICD-10-CM

## 2022-09-15 DIAGNOSIS — M54.40 CHRONIC LOW BACK PAIN WITH SCIATICA, SCIATICA LATERALITY UNSPECIFIED, UNSPECIFIED BACK PAIN LATERALITY: ICD-10-CM

## 2022-09-15 LAB
ALBUMIN SERPL BCP-MCNC: 4.2 G/DL (ref 3.5–5.2)
ALP SERPL-CCNC: 70 U/L (ref 55–135)
ALT SERPL W/O P-5'-P-CCNC: 63 U/L (ref 10–44)
ANION GAP SERPL CALC-SCNC: 10 MMOL/L (ref 8–16)
AST SERPL-CCNC: 33 U/L (ref 10–40)
BASOPHILS # BLD AUTO: 0.03 K/UL (ref 0–0.2)
BASOPHILS NFR BLD: 0.4 % (ref 0–1.9)
BILIRUB SERPL-MCNC: 0.9 MG/DL (ref 0.1–1)
BUN SERPL-MCNC: 12 MG/DL (ref 6–20)
CALCIUM SERPL-MCNC: 9.6 MG/DL (ref 8.7–10.5)
CHLORIDE SERPL-SCNC: 107 MMOL/L (ref 95–110)
CO2 SERPL-SCNC: 25 MMOL/L (ref 23–29)
CREAT SERPL-MCNC: 1 MG/DL (ref 0.5–1.4)
DIFFERENTIAL METHOD: ABNORMAL
EOSINOPHIL # BLD AUTO: 0.1 K/UL (ref 0–0.5)
EOSINOPHIL NFR BLD: 1.9 % (ref 0–8)
ERYTHROCYTE [DISTWIDTH] IN BLOOD BY AUTOMATED COUNT: 12.6 % (ref 11.5–14.5)
EST. GFR  (NO RACE VARIABLE): >60 ML/MIN/1.73 M^2
FERRITIN SERPL-MCNC: 251 NG/ML (ref 20–300)
GLUCOSE SERPL-MCNC: 80 MG/DL (ref 70–110)
HCT VFR BLD AUTO: 47.1 % (ref 40–54)
HGB BLD-MCNC: 16.3 G/DL (ref 14–18)
IMM GRANULOCYTES # BLD AUTO: 0.04 K/UL (ref 0–0.04)
IMM GRANULOCYTES NFR BLD AUTO: 0.6 % (ref 0–0.5)
IRON SERPL-MCNC: 92 UG/DL (ref 45–160)
LYMPHOCYTES # BLD AUTO: 1.8 K/UL (ref 1–4.8)
LYMPHOCYTES NFR BLD: 26.2 % (ref 18–48)
MCH RBC QN AUTO: 30.7 PG (ref 27–31)
MCHC RBC AUTO-ENTMCNC: 34.6 G/DL (ref 32–36)
MCV RBC AUTO: 89 FL (ref 82–98)
MONOCYTES # BLD AUTO: 0.7 K/UL (ref 0.3–1)
MONOCYTES NFR BLD: 10.3 % (ref 4–15)
NEUTROPHILS # BLD AUTO: 4.1 K/UL (ref 1.8–7.7)
NEUTROPHILS NFR BLD: 60.6 % (ref 38–73)
NRBC BLD-RTO: 0 /100 WBC
PLATELET # BLD AUTO: 211 K/UL (ref 150–450)
PMV BLD AUTO: 11.6 FL (ref 9.2–12.9)
POTASSIUM SERPL-SCNC: 4 MMOL/L (ref 3.5–5.1)
PROT SERPL-MCNC: 7.2 G/DL (ref 6–8.4)
RBC # BLD AUTO: 5.31 M/UL (ref 4.6–6.2)
SATURATED IRON: 28 % (ref 20–50)
SODIUM SERPL-SCNC: 142 MMOL/L (ref 136–145)
TOTAL IRON BINDING CAPACITY: 327 UG/DL (ref 250–450)
TRANSFERRIN SERPL-MCNC: 221 MG/DL (ref 200–375)
WBC # BLD AUTO: 6.72 K/UL (ref 3.9–12.7)

## 2022-09-15 PROCEDURE — 1160F RVW MEDS BY RX/DR IN RCRD: CPT | Mod: CPTII,S$GLB,, | Performed by: INTERNAL MEDICINE

## 2022-09-15 PROCEDURE — 36415 COLL VENOUS BLD VENIPUNCTURE: CPT | Performed by: INTERNAL MEDICINE

## 2022-09-15 PROCEDURE — 81256 HFE GENE: CPT | Performed by: INTERNAL MEDICINE

## 2022-09-15 PROCEDURE — 3078F DIAST BP <80 MM HG: CPT | Mod: CPTII,S$GLB,, | Performed by: INTERNAL MEDICINE

## 2022-09-15 PROCEDURE — 99999 PR PBB SHADOW E&M-EST. PATIENT-LVL IV: CPT | Mod: PBBFAC,,, | Performed by: INTERNAL MEDICINE

## 2022-09-15 PROCEDURE — 3008F PR BODY MASS INDEX (BMI) DOCUMENTED: ICD-10-PCS | Mod: CPTII,S$GLB,, | Performed by: INTERNAL MEDICINE

## 2022-09-15 PROCEDURE — 99214 PR OFFICE/OUTPT VISIT, EST, LEVL IV, 30-39 MIN: ICD-10-PCS | Mod: S$GLB,,, | Performed by: INTERNAL MEDICINE

## 2022-09-15 PROCEDURE — 1159F MED LIST DOCD IN RCRD: CPT | Mod: CPTII,S$GLB,, | Performed by: INTERNAL MEDICINE

## 2022-09-15 PROCEDURE — 3078F PR MOST RECENT DIASTOLIC BLOOD PRESSURE < 80 MM HG: ICD-10-PCS | Mod: CPTII,S$GLB,, | Performed by: INTERNAL MEDICINE

## 2022-09-15 PROCEDURE — 3074F PR MOST RECENT SYSTOLIC BLOOD PRESSURE < 130 MM HG: ICD-10-PCS | Mod: CPTII,S$GLB,, | Performed by: INTERNAL MEDICINE

## 2022-09-15 PROCEDURE — 1160F PR REVIEW ALL MEDS BY PRESCRIBER/CLIN PHARMACIST DOCUMENTED: ICD-10-PCS | Mod: CPTII,S$GLB,, | Performed by: INTERNAL MEDICINE

## 2022-09-15 PROCEDURE — 1159F PR MEDICATION LIST DOCUMENTED IN MEDICAL RECORD: ICD-10-PCS | Mod: CPTII,S$GLB,, | Performed by: INTERNAL MEDICINE

## 2022-09-15 PROCEDURE — 3008F BODY MASS INDEX DOCD: CPT | Mod: CPTII,S$GLB,, | Performed by: INTERNAL MEDICINE

## 2022-09-15 PROCEDURE — 85025 COMPLETE CBC W/AUTO DIFF WBC: CPT | Performed by: INTERNAL MEDICINE

## 2022-09-15 PROCEDURE — 82728 ASSAY OF FERRITIN: CPT | Performed by: INTERNAL MEDICINE

## 2022-09-15 PROCEDURE — 99999 PR PBB SHADOW E&M-EST. PATIENT-LVL IV: ICD-10-PCS | Mod: PBBFAC,,, | Performed by: INTERNAL MEDICINE

## 2022-09-15 PROCEDURE — 84466 ASSAY OF TRANSFERRIN: CPT | Performed by: INTERNAL MEDICINE

## 2022-09-15 PROCEDURE — 80053 COMPREHEN METABOLIC PANEL: CPT | Performed by: INTERNAL MEDICINE

## 2022-09-15 PROCEDURE — 3074F SYST BP LT 130 MM HG: CPT | Mod: CPTII,S$GLB,, | Performed by: INTERNAL MEDICINE

## 2022-09-15 PROCEDURE — 99214 OFFICE O/P EST MOD 30 MIN: CPT | Mod: S$GLB,,, | Performed by: INTERNAL MEDICINE

## 2022-09-16 ENCOUNTER — TELEPHONE (OUTPATIENT)
Dept: DERMATOLOGY | Facility: CLINIC | Age: 22
End: 2022-09-16
Payer: COMMERCIAL

## 2022-09-16 NOTE — TELEPHONE ENCOUNTER
----- Message from Cecile Cerda MA sent at 9/15/2022  1:01 PM CDT -----    ----- Message -----  From: Regina Siegel RN  Sent: 9/15/2022  12:01 PM CDT  To: Yes[facility] Staff    Good morning, Dr. Gilmore is referring this pt to dermatology. Thanks.

## 2022-09-20 LAB
GENETICIST REVIEW: NORMAL
HFE GENE MUT ANL BLD/T: NORMAL
HFE RELEASED BY: NORMAL
HFE RESULT SUMMARY: NEGATIVE
REF LAB TEST METHOD: NORMAL
SPECIMEN SOURCE: NORMAL
SPECIMEN,  HEMOCHROMATOSIS: NORMAL

## 2022-12-28 ENCOUNTER — OCCUPATIONAL HEALTH (OUTPATIENT)
Dept: URGENT CARE | Facility: CLINIC | Age: 22
End: 2022-12-28

## 2022-12-28 DIAGNOSIS — Z13.9 ENCOUNTER FOR SCREENING: Primary | ICD-10-CM

## 2022-12-28 PROCEDURE — 97750 IPCS BIODEX: ICD-10-PCS | Mod: S$GLB,,, | Performed by: EMERGENCY MEDICINE

## 2022-12-28 PROCEDURE — 97750 PHYSICAL PERFORMANCE TEST: CPT | Mod: S$GLB,,, | Performed by: EMERGENCY MEDICINE

## 2023-01-27 ENCOUNTER — PATIENT MESSAGE (OUTPATIENT)
Dept: HEPATOLOGY | Facility: CLINIC | Age: 23
End: 2023-01-27
Payer: COMMERCIAL

## 2023-04-27 ENCOUNTER — TELEPHONE (OUTPATIENT)
Dept: HEPATOLOGY | Facility: CLINIC | Age: 23
End: 2023-04-27
Payer: COMMERCIAL

## 2023-04-28 ENCOUNTER — TELEPHONE (OUTPATIENT)
Dept: HEPATOLOGY | Facility: CLINIC | Age: 23
End: 2023-04-28
Payer: COMMERCIAL

## 2023-04-28 NOTE — TELEPHONE ENCOUNTER
"----- Message from Antonio Jones sent at 4/28/2023  1:15 PM CDT -----  Cancel Existing Appointment        Appt Date: 4/28    Type of appt: F/u    Physician: Paul    Reason for cancellation? Pt is currently in [place] on business; Stating pt will call back to r/s once he returns home around 5/8    Caller: Viky (Grandmother)    Contact Preference: [phone]            Additional Information:  "Thank you for all that you do for our patients"      "

## 2023-05-04 ENCOUNTER — PATIENT MESSAGE (OUTPATIENT)
Dept: HEPATOLOGY | Facility: CLINIC | Age: 23
End: 2023-05-04
Payer: COMMERCIAL

## 2023-05-05 ENCOUNTER — PATIENT MESSAGE (OUTPATIENT)
Dept: HEMATOLOGY/ONCOLOGY | Facility: CLINIC | Age: 23
End: 2023-05-05
Payer: COMMERCIAL

## 2023-05-05 DIAGNOSIS — D3A.8 NEUROENDOCRINE TUMOR OF PANCREAS: Primary | ICD-10-CM

## 2023-05-05 NOTE — PROGRESS NOTES
PROGRESS NOTE    Subjective:       Patient ID: Homer Beatty is a 23 y.o. male.    Chief Complaint: resected pancreatic NET    Diagnosis:   Well differentiated low grade pancreatic NET    Oncologic History:       Oncologic History Pancreatic neuroendocrine tumor diagnosed 8/2017     Oncologic Treatment Distal pancreatectomy 10/17/17 (Dr. Aguilar)     Pathology Well differentiated neuroendocrine tumor of the pancreas, grade 1, Ki-67 less than 1%, pT2, N1, M0      Subjective:    Interval History:   Mr Beatty returns for follow up. 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  Further workup ensued leading to abdominal imaging which had revealed a 4.5 cm focal area of fullness in the tail of the pancreas.  An endoscopic ultrasound was performed revealing a low-grade neuroendocrine tumor.  There is no evidence of distant disease on imaging.  In 10/2017 he underwent a distal pancreatectomy.  Pathology had revealed a well-differentiated neuroendocrine tumor, grade 1 with negative margins.  The tumor was 5.1 cm with mitoses less than 2/10 HPF and Ki-67 less than 1%.  Necrosis was not identified nor was lymphovascular or perineural invasion.  2 out of 4 lymph nodes were positive for metastatic neuroendocrine tumor.  He also underwent a liver biopsy at that time showing mild macrosteatosis without evidence of hepatocellular damage or metastatic disease.  Of note he has a history of autoimmune hepatitis diagnosed at approximately age 6 and also a family history of a carcinoid tumor in his paternal grandfather.  Gallium-68 PET/CT in 11/2017 was negative for distant disease. MEN-1 and VHL testing were negative.    Mr Beatty returns for follow up. He is feeling well. He is a . Still has low back pain radiating down his lets.     ECOG:  0    ROS:   A ten-point system review is obtained and negative except for what was stated in the Interval History.     Physical Examination:   Vital signs reviewed.   General: well hydrated, well developed, in no acute distress  HEENT: normocephalic, PERRLA, EOMI, anicteric sclerae, oropharynx clear. +facial redness  Neck: supple, no JVD, thyromegaly, cervical or supraclavicular lymphadenopathy  Lungs: clear breath sounds bilaterally, no wheezing, rales, or rhonchi  Heart: RRR, no M/R/G  Abdomen: soft, no tenderness, non-distended, no hepatosplenomegaly, mass, or hernia. BS present  Extremities: no clubbing, cyanosis, or edema  Skin: generalized redness over face, neck, abdomen, bilateral arms and legs. On compression skin color returns to normal. No ulcer, or open wounds  Neuro: alert and oriented x 4, no focal neuro deficit  Psych: pleasant and appropriate mood and affect    Objective:     Laboratory Data:  Labs reviewed. CBC, CMP unremarkable    Imaging Data:  MRI abdomen 8/4/22:  Impression:     Normal MRI.  No significant abnormality seen.     Hepatosplenomegaly.       Assessment and Plan:     1. Neuroendocrine tumor of pancreas    2. Family history of carcinoid tumor    3. Chronic bilateral low back pain with bilateral sciatica      1.2  - Mr Beatty is a 22 yo man with pathologic T2N1 well differentiated low grade NET s/p distal pancreatectomy in 10/2017  - doing well.   - f/u on biomarker results  - continue with surveillance. Scheduled for yearly MRI abdomen/pelvis on 9/7. Will keep that appointment  - his grandfather also has neuroendocrine tumor. Aunt positive for BRCA. But both grandparents are negative. Discussed referral to genetics. He was negative for VHL and MEN1 but it is worth testing other genes. Patient agrees with seeing genetics  - messaged genetic counselors    3.  - scheduled for MRI lumbar spine on 9/7. Patient would like to keep that appointment    Follow-up:     RTC in Sep with biomarkers  and MRI  Knows to call in the interval if any problems arise.    Electronically signed by Verena Brar Chart for Scheduling    Med Onc Chart Routing      Follow up with physician 4 months. scheduled for MRI lumbar spine and abdomen/pelvis on 9/7. please add CBC, CMP, serotonin, pancreastatin, 5-HIAA on the same day. see me 1-2 days after scan/labs   Follow up with LACE[facility]    Infusion scheduling note    Injection scheduling note    Labs CBC and CMP   Scheduling:  Preferred lab:  Lab interval:  serotonin, pancreastatin, 5-HIAA in Sep   Imaging MRI   scheduled   Pharmacy appointment    Other referrals

## 2023-05-08 ENCOUNTER — LAB VISIT (OUTPATIENT)
Dept: LAB | Facility: HOSPITAL | Age: 23
End: 2023-05-08
Attending: INTERNAL MEDICINE
Payer: COMMERCIAL

## 2023-05-08 ENCOUNTER — OFFICE VISIT (OUTPATIENT)
Dept: HEMATOLOGY/ONCOLOGY | Facility: CLINIC | Age: 23
End: 2023-05-08
Payer: COMMERCIAL

## 2023-05-08 VITALS
TEMPERATURE: 98 F | DIASTOLIC BLOOD PRESSURE: 65 MMHG | OXYGEN SATURATION: 96 % | RESPIRATION RATE: 18 BRPM | WEIGHT: 287.25 LBS | HEART RATE: 83 BPM | BODY MASS INDEX: 38.07 KG/M2 | SYSTOLIC BLOOD PRESSURE: 121 MMHG | HEIGHT: 73 IN

## 2023-05-08 DIAGNOSIS — D3A.8 NEUROENDOCRINE TUMOR OF PANCREAS: ICD-10-CM

## 2023-05-08 DIAGNOSIS — M54.41 CHRONIC BILATERAL LOW BACK PAIN WITH BILATERAL SCIATICA: ICD-10-CM

## 2023-05-08 DIAGNOSIS — M54.42 CHRONIC BILATERAL LOW BACK PAIN WITH BILATERAL SCIATICA: ICD-10-CM

## 2023-05-08 DIAGNOSIS — D3A.8 NEUROENDOCRINE TUMOR OF PANCREAS: Primary | ICD-10-CM

## 2023-05-08 DIAGNOSIS — Z80.9 FAMILY HISTORY OF CARCINOID TUMOR: ICD-10-CM

## 2023-05-08 DIAGNOSIS — G89.29 CHRONIC BILATERAL LOW BACK PAIN WITH BILATERAL SCIATICA: ICD-10-CM

## 2023-05-08 LAB
ALBUMIN SERPL BCP-MCNC: 4 G/DL (ref 3.5–5.2)
ALP SERPL-CCNC: 70 U/L (ref 55–135)
ALT SERPL W/O P-5'-P-CCNC: 65 U/L (ref 10–44)
ANION GAP SERPL CALC-SCNC: 9 MMOL/L (ref 8–16)
AST SERPL-CCNC: 31 U/L (ref 10–40)
BASOPHILS # BLD AUTO: 0.04 K/UL (ref 0–0.2)
BASOPHILS NFR BLD: 0.6 % (ref 0–1.9)
BILIRUB SERPL-MCNC: 0.5 MG/DL (ref 0.1–1)
BUN SERPL-MCNC: 8 MG/DL (ref 6–20)
CALCIUM SERPL-MCNC: 9.1 MG/DL (ref 8.7–10.5)
CHLORIDE SERPL-SCNC: 105 MMOL/L (ref 95–110)
CO2 SERPL-SCNC: 24 MMOL/L (ref 23–29)
CREAT SERPL-MCNC: 1 MG/DL (ref 0.5–1.4)
DIFFERENTIAL METHOD: ABNORMAL
EOSINOPHIL # BLD AUTO: 0.1 K/UL (ref 0–0.5)
EOSINOPHIL NFR BLD: 2 % (ref 0–8)
ERYTHROCYTE [DISTWIDTH] IN BLOOD BY AUTOMATED COUNT: 12.4 % (ref 11.5–14.5)
EST. GFR  (NO RACE VARIABLE): >60 ML/MIN/1.73 M^2
GLUCOSE SERPL-MCNC: 116 MG/DL (ref 70–110)
HCT VFR BLD AUTO: 46.9 % (ref 40–54)
HGB BLD-MCNC: 15.7 G/DL (ref 14–18)
IMM GRANULOCYTES # BLD AUTO: 0.06 K/UL (ref 0–0.04)
IMM GRANULOCYTES NFR BLD AUTO: 0.9 % (ref 0–0.5)
LYMPHOCYTES # BLD AUTO: 2.4 K/UL (ref 1–4.8)
LYMPHOCYTES NFR BLD: 36 % (ref 18–48)
MCH RBC QN AUTO: 30.1 PG (ref 27–31)
MCHC RBC AUTO-ENTMCNC: 33.5 G/DL (ref 32–36)
MCV RBC AUTO: 90 FL (ref 82–98)
MONOCYTES # BLD AUTO: 0.6 K/UL (ref 0.3–1)
MONOCYTES NFR BLD: 8.4 % (ref 4–15)
NEUTROPHILS # BLD AUTO: 3.4 K/UL (ref 1.8–7.7)
NEUTROPHILS NFR BLD: 52.1 % (ref 38–73)
NRBC BLD-RTO: 0 /100 WBC
PLATELET # BLD AUTO: 199 K/UL (ref 150–450)
PMV BLD AUTO: 11.4 FL (ref 9.2–12.9)
POTASSIUM SERPL-SCNC: 3.9 MMOL/L (ref 3.5–5.1)
PROT SERPL-MCNC: 6.7 G/DL (ref 6–8.4)
RBC # BLD AUTO: 5.21 M/UL (ref 4.6–6.2)
SODIUM SERPL-SCNC: 138 MMOL/L (ref 136–145)
WBC # BLD AUTO: 6.58 K/UL (ref 3.9–12.7)

## 2023-05-08 PROCEDURE — 80053 COMPREHEN METABOLIC PANEL: CPT | Performed by: INTERNAL MEDICINE

## 2023-05-08 PROCEDURE — 1159F PR MEDICATION LIST DOCUMENTED IN MEDICAL RECORD: ICD-10-PCS | Mod: CPTII,S$GLB,, | Performed by: INTERNAL MEDICINE

## 2023-05-08 PROCEDURE — 99214 PR OFFICE/OUTPT VISIT, EST, LEVL IV, 30-39 MIN: ICD-10-PCS | Mod: S$GLB,,, | Performed by: INTERNAL MEDICINE

## 2023-05-08 PROCEDURE — 84260 ASSAY OF SEROTONIN: CPT | Performed by: INTERNAL MEDICINE

## 2023-05-08 PROCEDURE — 83497 ASSAY OF 5-HIAA: CPT | Performed by: INTERNAL MEDICINE

## 2023-05-08 PROCEDURE — 99214 OFFICE O/P EST MOD 30 MIN: CPT | Mod: S$GLB,,, | Performed by: INTERNAL MEDICINE

## 2023-05-08 PROCEDURE — 99999 PR PBB SHADOW E&M-EST. PATIENT-LVL IV: ICD-10-PCS | Mod: PBBFAC,,, | Performed by: INTERNAL MEDICINE

## 2023-05-08 PROCEDURE — 3074F SYST BP LT 130 MM HG: CPT | Mod: CPTII,S$GLB,, | Performed by: INTERNAL MEDICINE

## 2023-05-08 PROCEDURE — 99999 PR PBB SHADOW E&M-EST. PATIENT-LVL IV: CPT | Mod: PBBFAC,,, | Performed by: INTERNAL MEDICINE

## 2023-05-08 PROCEDURE — 85025 COMPLETE CBC W/AUTO DIFF WBC: CPT | Performed by: INTERNAL MEDICINE

## 2023-05-08 PROCEDURE — 3008F BODY MASS INDEX DOCD: CPT | Mod: CPTII,S$GLB,, | Performed by: INTERNAL MEDICINE

## 2023-05-08 PROCEDURE — 3078F DIAST BP <80 MM HG: CPT | Mod: CPTII,S$GLB,, | Performed by: INTERNAL MEDICINE

## 2023-05-08 PROCEDURE — 1160F PR REVIEW ALL MEDS BY PRESCRIBER/CLIN PHARMACIST DOCUMENTED: ICD-10-PCS | Mod: CPTII,S$GLB,, | Performed by: INTERNAL MEDICINE

## 2023-05-08 PROCEDURE — 1160F RVW MEDS BY RX/DR IN RCRD: CPT | Mod: CPTII,S$GLB,, | Performed by: INTERNAL MEDICINE

## 2023-05-08 PROCEDURE — 3074F PR MOST RECENT SYSTOLIC BLOOD PRESSURE < 130 MM HG: ICD-10-PCS | Mod: CPTII,S$GLB,, | Performed by: INTERNAL MEDICINE

## 2023-05-08 PROCEDURE — 83519 RIA NONANTIBODY: CPT | Performed by: INTERNAL MEDICINE

## 2023-05-08 PROCEDURE — 3078F PR MOST RECENT DIASTOLIC BLOOD PRESSURE < 80 MM HG: ICD-10-PCS | Mod: CPTII,S$GLB,, | Performed by: INTERNAL MEDICINE

## 2023-05-08 PROCEDURE — 1159F MED LIST DOCD IN RCRD: CPT | Mod: CPTII,S$GLB,, | Performed by: INTERNAL MEDICINE

## 2023-05-08 PROCEDURE — 3008F PR BODY MASS INDEX (BMI) DOCUMENTED: ICD-10-PCS | Mod: CPTII,S$GLB,, | Performed by: INTERNAL MEDICINE

## 2023-05-08 PROCEDURE — 36415 COLL VENOUS BLD VENIPUNCTURE: CPT | Performed by: INTERNAL MEDICINE

## 2023-05-09 ENCOUNTER — PATIENT MESSAGE (OUTPATIENT)
Dept: HEMATOLOGY/ONCOLOGY | Facility: CLINIC | Age: 23
End: 2023-05-09
Payer: COMMERCIAL

## 2023-05-11 LAB — SEROTONIN: 112 NG/ML

## 2023-05-16 LAB — PANCREASTATIN SERPL-MCNC: 46 PG/ML (ref 10–135)

## 2023-05-17 LAB — 5OH-INDOLEACETATE SERPL-MCNC: 8 NG/ML

## 2023-05-22 ENCOUNTER — PATIENT MESSAGE (OUTPATIENT)
Dept: HEMATOLOGY/ONCOLOGY | Facility: CLINIC | Age: 23
End: 2023-05-22
Payer: COMMERCIAL

## 2023-06-13 ENCOUNTER — HOSPITAL ENCOUNTER (EMERGENCY)
Facility: HOSPITAL | Age: 23
Discharge: HOME | End: 2023-06-14
Attending: EMERGENCY MEDICINE
Payer: COMMERCIAL

## 2023-06-13 DIAGNOSIS — K52.9 GASTROENTERITIS: Primary | ICD-10-CM

## 2023-06-13 LAB
ALBUMIN SERPL-MCNC: 4.1 G/DL (ref 3.3–5.5)
ALBUMIN SERPL-MCNC: 4.2 G/DL (ref 3.3–5.5)
ALP SERPL-CCNC: 60 U/L (ref 42–141)
ALP SERPL-CCNC: 65 U/L (ref 42–141)
BILIRUB SERPL-MCNC: 1.6 MG/DL (ref 0.2–1.6)
BILIRUB SERPL-MCNC: 1.9 MG/DL (ref 0.2–1.6)
BILIRUBIN, POC UA: NEGATIVE
BLOOD, POC UA: NEGATIVE
BUN SERPL-MCNC: 12 MG/DL (ref 7–22)
CALCIUM SERPL-MCNC: 9.5 MG/DL (ref 8–10.3)
CHLORIDE SERPL-SCNC: 104 MMOL/L (ref 98–108)
CLARITY, POC UA: CLEAR
COLOR, POC UA: YELLOW
CREAT SERPL-MCNC: 1.4 MG/DL (ref 0.6–1.2)
GLUCOSE SERPL-MCNC: 99 MG/DL (ref 73–118)
GLUCOSE, POC UA: NEGATIVE
HCT, POC: NORMAL
HGB, POC: NORMAL (ref 14–18)
KETONES, POC UA: NEGATIVE
LEUKOCYTE EST, POC UA: NEGATIVE
MCH, POC: NORMAL
MCHC, POC: NORMAL
MCV, POC: NORMAL
MPV, POC: NORMAL
NITRITE, POC UA: NEGATIVE
PH UR STRIP: 5.5 [PH]
POC ALT (SGPT): 42 U/L (ref 10–47)
POC ALT (SGPT): 59 U/L (ref 10–47)
POC AMYLASE: 23 U/L (ref 14–97)
POC AST (SGOT): 31 U/L (ref 11–38)
POC AST (SGOT): 42 U/L (ref 11–38)
POC GGT: 26 U/L (ref 5–65)
POC PLATELET COUNT: NORMAL
POC TCO2: 27 MMOL/L (ref 18–33)
POTASSIUM BLD-SCNC: 4.3 MMOL/L (ref 3.6–5.1)
PROTEIN, POC UA: ABNORMAL
PROTEIN, POC: 7.2 G/DL (ref 6.4–8.1)
PROTEIN, POC: 7.3 G/DL (ref 6.4–8.1)
RBC, POC: NORMAL
RDW, POC: NORMAL
SODIUM BLD-SCNC: 139 MMOL/L (ref 128–145)
SPECIFIC GRAVITY, POC UA: 1.01
UROBILINOGEN, POC UA: 0.2 E.U./DL
WBC, POC: NORMAL

## 2023-06-13 PROCEDURE — 25500020 PHARM REV CODE 255: Mod: ER | Performed by: EMERGENCY MEDICINE

## 2023-06-13 PROCEDURE — 96374 THER/PROPH/DIAG INJ IV PUSH: CPT | Mod: 59,ER

## 2023-06-13 PROCEDURE — 99285 EMERGENCY DEPT VISIT HI MDM: CPT | Mod: 25,ER

## 2023-06-13 PROCEDURE — 80053 COMPREHEN METABOLIC PANEL: CPT | Mod: ER

## 2023-06-13 PROCEDURE — 96361 HYDRATE IV INFUSION ADD-ON: CPT | Mod: ER

## 2023-06-13 PROCEDURE — 81003 URINALYSIS AUTO W/O SCOPE: CPT | Mod: ER

## 2023-06-13 PROCEDURE — 25000003 PHARM REV CODE 250: Mod: ER | Performed by: EMERGENCY MEDICINE

## 2023-06-13 PROCEDURE — 63600175 PHARM REV CODE 636 W HCPCS: Mod: ER | Performed by: EMERGENCY MEDICINE

## 2023-06-13 PROCEDURE — 85025 COMPLETE CBC W/AUTO DIFF WBC: CPT | Mod: ER

## 2023-06-13 PROCEDURE — 82150 ASSAY OF AMYLASE: CPT | Mod: ER

## 2023-06-13 RX ORDER — ONDANSETRON 2 MG/ML
4 INJECTION INTRAMUSCULAR; INTRAVENOUS
Status: COMPLETED | OUTPATIENT
Start: 2023-06-13 | End: 2023-06-13

## 2023-06-13 RX ADMIN — IOHEXOL 100 ML: 350 INJECTION, SOLUTION INTRAVENOUS at 11:06

## 2023-06-13 RX ADMIN — SODIUM CHLORIDE 1000 ML: 9 INJECTION, SOLUTION INTRAVENOUS at 10:06

## 2023-06-13 RX ADMIN — ONDANSETRON 4 MG: 2 INJECTION INTRAMUSCULAR; INTRAVENOUS at 10:06

## 2023-06-13 NOTE — Clinical Note
"Homer Rasheed" Rogerio was seen and treated in our emergency department on 6/13/2023.  He may return to work on 06/15/2023.       If you have any questions or concerns, please don't hesitate to call.      CORINNE Jang RN    "

## 2023-06-14 VITALS
SYSTOLIC BLOOD PRESSURE: 138 MMHG | RESPIRATION RATE: 18 BRPM | WEIGHT: 279 LBS | DIASTOLIC BLOOD PRESSURE: 74 MMHG | BODY MASS INDEX: 36.81 KG/M2 | HEART RATE: 75 BPM | TEMPERATURE: 99 F | OXYGEN SATURATION: 98 %

## 2023-06-14 RX ORDER — ONDANSETRON 4 MG/1
4 TABLET, ORALLY DISINTEGRATING ORAL EVERY 6 HOURS PRN
Qty: 10 TABLET | Refills: 0 | Status: SHIPPED | OUTPATIENT
Start: 2023-06-14 | End: 2024-02-20

## 2023-06-14 RX ORDER — METOCLOPRAMIDE 10 MG/1
10 TABLET ORAL EVERY 6 HOURS PRN
Qty: 30 TABLET | Refills: 0 | Status: SHIPPED | OUTPATIENT
Start: 2023-06-14 | End: 2024-02-20

## 2023-06-14 NOTE — ED PROVIDER NOTES
Encounter Date: 6/13/2023    SCRIBE #1 NOTE: I, Cassie Oscar, am scribing for, and in the presence of,  French Christensen MD. I have scribed the following portions of the note - Other sections scribed: HPI,ROS,PE.     History     Chief Complaint   Patient presents with    Emesis     Started around 3am today. +diarrhea, +lower back pain      Homer Beatty is a 23 y.o. male, with a PMHx of pancreatic cancer and chronic abdominal pain with PSHx of pancreatectomy, who presents to the ED with vomiting, back pain, diarrhea, nausea, and fever onset 1 day ago. Patient reports that he took a nap and reports that he awoke with a fever of 100.1 which is what brought him into the ED today. Patient endorses taking imodium, 30mg enteral and motrin(4 hours ago) with minor alleviation to symptoms. No other exacerbating or alleviating factors. Denies bloody stool or other associated symptoms. Patient has no other complaints at the present time.    The history is provided by the patient and a relative.   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, selective     New onset of headaches 02/12/2018    Pancreatic cyst 05/07/2018    Regional lymph node metastasis present 11/01/2017     Past Surgical History:   Procedure Laterality Date    CIRCUMCISION      LIVER BIOPSY  3/15/2013    PANCREATECTOMY      TONSILLECTOMY, ADENOIDECTOMY       Family History   Problem Relation Age of Onset    No Known Problems Mother     No Known Problems Father     Cancer Maternal Grandfather         unknown    No Known Problems Brother     Breast  cancer Paternal Aunt     Cancer Paternal Aunt         breast (BRCA2)    Heart disease Maternal Grandmother     Hypertension Maternal Grandmother     Diabetes Maternal Grandmother     No Known Problems Paternal Grandmother     Cancer Paternal Grandfather         Small intestine carcinoid    Colon polyps Paternal Grandfather     No Known Problems Brother     Cancer Maternal Aunt         breast    Melanoma Neg Hx     Lupus Neg Hx     Psoriasis Neg Hx     Congenital heart disease Neg Hx     Pacemaker/defibrilator Neg Hx     Early death Neg Hx      Social History     Tobacco Use    Smoking status: Never    Smokeless tobacco: Never   Substance Use Topics    Alcohol use: No    Drug use: No     Review of Systems   Constitutional:  Positive for fever.   HENT: Negative.  Negative for sore throat.    Eyes: Negative.    Respiratory: Negative.  Negative for shortness of breath.    Cardiovascular: Negative.  Negative for chest pain.   Gastrointestinal:  Positive for diarrhea, nausea and vomiting. Negative for blood in stool.   Endocrine: Negative.    Genitourinary: Negative.  Negative for dysuria.   Musculoskeletal:  Positive for back pain. Negative for myalgias.   Skin:  Negative for rash.   Allergic/Immunologic: Negative.    Neurological: Negative.  Negative for headaches.   Hematological: Negative.  Negative for adenopathy.   Psychiatric/Behavioral: Negative.  Negative for behavioral problems.    All other systems reviewed and are negative.    Physical Exam     Initial Vitals [06/13/23 2055]   BP Pulse Resp Temp SpO2   114/81 (!) 125 18 98.6 °F (37 °C) 97 %      MAP       --         Physical Exam    Nursing note and vitals reviewed.  Constitutional: He appears well-developed and well-nourished.   Patient is apprehensive.   HENT:   Head: Normocephalic and atraumatic.   Eyes: Conjunctivae and EOM are normal.   Neck: Neck supple.   Normal range of motion.  Cardiovascular:  Normal rate, regular rhythm and intact distal pulses.      Exam reveals no gallop and no friction rub.       No murmur heard.  Pulmonary/Chest: Effort normal and breath sounds normal. No respiratory distress.   Abdominal: Abdomen is soft. There is abdominal tenderness (reproducible) in the left lower quadrant.   Musculoskeletal:         General: Normal range of motion.      Cervical back: Normal range of motion and neck supple.     Neurological: He is alert and oriented to person, place, and time.   Skin: Skin is warm and dry.   Psychiatric: He has a normal mood and affect. His behavior is normal.       ED Course   Procedures  Labs Reviewed   POCT URINALYSIS W/O SCOPE - Abnormal; Notable for the following components:       Result Value    Protein, UA Trace (*)     All other components within normal limits   POCT LIVER PANEL - Abnormal; Notable for the following components:    Bilirubin, POC 1.9 (*)     All other components within normal limits   POCT CMP - Abnormal; Notable for the following components:    ALT (SGPT), POC 59 (*)     AST (SGOT), POC 42 (*)     POC Creatinine 1.4 (*)     All other components within normal limits   POCT CBC   POCT URINALYSIS W/O SCOPE   POCT CMP   POCT LIVER PANEL          Imaging Results              CT Abdomen Pelvis With Contrast (Final result)  Result time 06/13/23 23:54:32      Final result by Brayden Lopez MD (06/13/23 23:54:32)                   Impression:      No acute intra-abdominal abnormalities identified.      Electronically signed by: Brayden Lopez MD  Date:    06/13/2023  Time:    23:54               Narrative:    EXAMINATION:  CT ABDOMEN PELVIS WITH CONTRAST    CLINICAL HISTORY:  LLQ abdominal pain;    TECHNIQUE:  Low dose axial images, sagittal and coronal reformations were obtained from the lung bases to the pubic symphysis following the IV administration of 100 mL of Omnipaque 350 .  Oral contrast was not given.    COMPARISON:  MRI abdomen August 2022.    FINDINGS:  The visualized portion of the heart is unremarkable.   The lung bases are clear.    No significant focal hepatic abnormalities are identified.  There is no intra-or extrahepatic biliary ductal dilatation.  The gallbladder is unremarkable.  The stomach, pancreas, and adrenal glands are unremarkable.  Spleen is mildly enlarged measuring 13.5 cm.  Perisplenic collaterals are seen.  Portal vein and splenic vein are patent.    Kidneys enhance normally with no evidence of hydronephrosis.  No abnormalities are seen along the ureteral courses.  Urinary bladder and prostate are unremarkable.    Appendix is visualized and is unremarkable.  The visualized loops of small and large bowel show no evidence of obstruction or inflammation.  No free air or free fluid.    Aorta tapers normally.    No acute osseous abnormality identified. Subcutaneous soft tissues show no significant abnormalities.                                       Medications   sodium chloride 0.9% bolus 1,000 mL 1,000 mL (0 mLs Intravenous Stopped 6/13/23 2338)   ondansetron injection 4 mg (4 mg Intravenous Given 6/13/23 2236)   iohexoL (OMNIPAQUE 350) injection 100 mL (100 mLs Intravenous Given 6/13/23 2317)     Medical Decision Making:   History:   Old Medical Records: I decided to obtain old medical records.  ED Management:  This patient presents to the emergency department with prior history of pancreatic cancer.  Patient had some apprehension on physical examination but did not have any rebound or guarding.  Patient received IV fluid resuscitation and appropriate medications and had significant improvement in his symptomatology.  Patient's laboratory workup showed no evidence of any recurrence of any pancreatic process.  Course I considered other GI and  etiologies for the patient's presenting symptomatology.  Patient's subsequently discharged on appropriate medications.  Patient has no barriers to care at home.        Scribe Attestation:   Scribe #1: I performed the above scribed service and the documentation  accurately describes the services I performed. I attest to the accuracy of the note.            This document was produced by a scribe under my direction and in my presence. I agree with the content of the note and have made any necessary edits.     French Christensen MD    06/14/2023 7:02 AM         Clinical Impression:   Final diagnoses:  [K52.9] Gastroenteritis (Primary)        ED Disposition Condition    Discharge Stable          ED Prescriptions       Medication Sig Dispense Start Date End Date Auth. Provider    ondansetron (ZOFRAN-ODT) 4 MG TbDL Take 1 tablet (4 mg total) by mouth every 6 (six) hours as needed. 10 tablet 6/14/2023 -- French Christensen MD    metoclopramide HCl (REGLAN) 10 MG tablet Take 1 tablet (10 mg total) by mouth every 6 (six) hours as needed. 30 tablet 6/14/2023 -- French Christensen MD          Follow-up Information       Follow up With Specialties Details Why Contact Info    Torsten Renteria MD Internal Medicine Schedule an appointment as soon as possible for a visit in 1 week  [address]  [address]  [facility] [address]  [phone]               French Christensen MD  06/14/23 0702

## 2023-08-11 ENCOUNTER — HOSPITAL ENCOUNTER (EMERGENCY)
Facility: HOSPITAL | Age: 23
Discharge: HOME | End: 2023-08-11
Attending: EMERGENCY MEDICINE
Payer: COMMERCIAL

## 2023-08-11 VITALS
WEIGHT: 280 LBS | DIASTOLIC BLOOD PRESSURE: 76 MMHG | TEMPERATURE: 98 F | HEART RATE: 86 BPM | HEIGHT: 73 IN | OXYGEN SATURATION: 99 % | BODY MASS INDEX: 37.11 KG/M2 | RESPIRATION RATE: 18 BRPM | SYSTOLIC BLOOD PRESSURE: 119 MMHG

## 2023-08-11 DIAGNOSIS — M25.521 ELBOW PAIN, RIGHT: Primary | ICD-10-CM

## 2023-08-11 LAB
ALBUMIN SERPL BCP-MCNC: 4.1 G/DL (ref 3.5–5.2)
ALP SERPL-CCNC: 65 U/L (ref 55–135)
ALT SERPL W/O P-5'-P-CCNC: 79 U/L (ref 10–44)
ANION GAP SERPL CALC-SCNC: 6 MMOL/L (ref 8–16)
AST SERPL-CCNC: 141 U/L (ref 10–40)
BASOPHILS # BLD AUTO: 0.04 K/UL (ref 0–0.2)
BASOPHILS NFR BLD: 0.7 % (ref 0–1.9)
BILIRUB SERPL-MCNC: 0.7 MG/DL (ref 0.1–1)
BUN SERPL-MCNC: 7 MG/DL (ref 6–20)
CALCIUM SERPL-MCNC: 9.3 MG/DL (ref 8.7–10.5)
CHLORIDE SERPL-SCNC: 109 MMOL/L (ref 95–110)
CO2 SERPL-SCNC: 26 MMOL/L (ref 23–29)
CREAT SERPL-MCNC: 0.9 MG/DL (ref 0.5–1.4)
CRP SERPL-MCNC: 2.8 MG/L (ref 0–8.2)
DIFFERENTIAL METHOD: ABNORMAL
EOSINOPHIL # BLD AUTO: 0.1 K/UL (ref 0–0.5)
EOSINOPHIL NFR BLD: 1.5 % (ref 0–8)
ERYTHROCYTE [DISTWIDTH] IN BLOOD BY AUTOMATED COUNT: 12.6 % (ref 11.5–14.5)
ERYTHROCYTE [SEDIMENTATION RATE] IN BLOOD BY PHOTOMETRIC METHOD: 3 MM/HR (ref 0–23)
EST. GFR  (NO RACE VARIABLE): >60 ML/MIN/1.73 M^2
GLUCOSE SERPL-MCNC: 94 MG/DL (ref 70–110)
HCT VFR BLD AUTO: 46.1 % (ref 40–54)
HCV AB SERPL QL IA: NORMAL
HGB BLD-MCNC: 16 G/DL (ref 14–18)
HIV 1+2 AB+HIV1 P24 AG SERPL QL IA: NORMAL
IMM GRANULOCYTES # BLD AUTO: 0.02 K/UL (ref 0–0.04)
IMM GRANULOCYTES NFR BLD AUTO: 0.4 % (ref 0–0.5)
LYMPHOCYTES # BLD AUTO: 1.9 K/UL (ref 1–4.8)
LYMPHOCYTES NFR BLD: 34.2 % (ref 18–48)
MCH RBC QN AUTO: 31.1 PG (ref 27–31)
MCHC RBC AUTO-ENTMCNC: 34.7 G/DL (ref 32–36)
MCV RBC AUTO: 90 FL (ref 82–98)
MONOCYTES # BLD AUTO: 0.6 K/UL (ref 0.3–1)
MONOCYTES NFR BLD: 10.9 % (ref 4–15)
NEUTROPHILS # BLD AUTO: 2.9 K/UL (ref 1.8–7.7)
NEUTROPHILS NFR BLD: 52.3 % (ref 38–73)
NRBC BLD-RTO: 0 /100 WBC
PLATELET # BLD AUTO: 188 K/UL (ref 150–450)
PMV BLD AUTO: 11.4 FL (ref 9.2–12.9)
POTASSIUM SERPL-SCNC: 4.3 MMOL/L (ref 3.5–5.1)
PROT SERPL-MCNC: 6.9 G/DL (ref 6–8.4)
RBC # BLD AUTO: 5.14 M/UL (ref 4.6–6.2)
SODIUM SERPL-SCNC: 141 MMOL/L (ref 136–145)
WBC # BLD AUTO: 5.5 K/UL (ref 3.9–12.7)

## 2023-08-11 PROCEDURE — 80053 COMPREHEN METABOLIC PANEL: CPT | Performed by: EMERGENCY MEDICINE

## 2023-08-11 PROCEDURE — 86140 C-REACTIVE PROTEIN: CPT | Performed by: EMERGENCY MEDICINE

## 2023-08-11 PROCEDURE — 99284 EMERGENCY DEPT VISIT MOD MDM: CPT

## 2023-08-11 PROCEDURE — 87389 HIV-1 AG W/HIV-1&-2 AB AG IA: CPT | Performed by: PHYSICIAN ASSISTANT

## 2023-08-11 PROCEDURE — 85652 RBC SED RATE AUTOMATED: CPT | Performed by: EMERGENCY MEDICINE

## 2023-08-11 PROCEDURE — 85025 COMPLETE CBC W/AUTO DIFF WBC: CPT | Performed by: EMERGENCY MEDICINE

## 2023-08-11 PROCEDURE — 25000003 PHARM REV CODE 250: Performed by: EMERGENCY MEDICINE

## 2023-08-11 PROCEDURE — 86803 HEPATITIS C AB TEST: CPT | Performed by: PHYSICIAN ASSISTANT

## 2023-08-11 RX ORDER — IBUPROFEN 400 MG/1
400 TABLET ORAL
Status: COMPLETED | OUTPATIENT
Start: 2023-08-11 | End: 2023-08-11

## 2023-08-11 RX ADMIN — IBUPROFEN 400 MG: 400 TABLET ORAL at 11:08

## 2023-08-11 NOTE — ED TRIAGE NOTES
"Homer Beatty, a 23 y.o. male presents to the ED w/ complaint of rt arm pain. Pt states that he was doing some heavy lifting on Wednesday and his rt arm has been hurting since. Pt states that he is unable to extend his arm from the elbow. He states his finger feel funny and his lower back also feels funny. He states that his right shoulder is also achy. Pt denies any trauma or direct injury to his rt arm    Triage note:  Chief Complaint   Patient presents with    Arm Pain     R forearm pain since wedneday after lifting boxes. Reports unable to straighten arm due to "increased tension." Denies injury. + radial pulse. + tingling to hand.      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, selective     New onset of headaches 02/12/2018    Pancreatic cyst 05/07/2018    Regional lymph node metastasis present 11/01/2017      "

## 2023-08-11 NOTE — ED PROVIDER NOTES
"Encounter Date: 8/11/2023       History     Chief Complaint   Patient presents with    Arm Pain     R forearm pain since wedneday after lifting boxes. Reports unable to straighten arm due to "increased tension." Denies injury. + radial pulse. + tingling to hand.      23-year-old male with a history of autoimmune hepatitis, neuro endocrine tumor, now in remission, presenting with right elbow/proximal forearm pain x2 days.  Patient states he was hit lifting very heavy boxes a couple of days ago and then yesterday developed severe pain in his right forearm/elbow, limiting full extension of the forearm.  Pain has persisted.  It is worse with movement.  He is had no actual trauma to the arm.  He is had no fevers or chills or any systemic symptoms.    The history is provided by the patient.     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, selective     New onset of headaches 02/12/2018    Pancreatic cyst 05/07/2018    Regional lymph node metastasis present 11/01/2017     Past Surgical History:   Procedure Laterality Date    CIRCUMCISION      LIVER BIOPSY  3/15/2013    PANCREATECTOMY      TONSILLECTOMY, ADENOIDECTOMY       Family History   Problem Relation Age of Onset    No Known Problems Mother     No Known Problems Father     Cancer Maternal Grandfather         unknown    No Known Problems Brother     Breast cancer Paternal Aunt     Cancer Paternal Aunt         breast (BRCA2)    Heart disease Maternal Grandmother     Hypertension Maternal Grandmother     Diabetes Maternal Grandmother     No Known Problems Paternal " Grandmother     Cancer Paternal Grandfather         Small intestine carcinoid    Colon polyps Paternal Grandfather     No Known Problems Brother     Cancer Maternal Aunt         breast    Melanoma Neg Hx     Lupus Neg Hx     Psoriasis Neg Hx     Congenital heart disease Neg Hx     Pacemaker/defibrilator Neg Hx     Early death Neg Hx      Social History     Tobacco Use    Smoking status: Never    Smokeless tobacco: Never   Substance Use Topics    Alcohol use: No    Drug use: No     Review of Systems    Physical Exam     Initial Vitals [08/11/23 1021]   BP Pulse Resp Temp SpO2   (!) 143/80 84 18 98.3 °F (36.8 °C) 97 %      MAP       --         Physical Exam    Nursing note and vitals reviewed.  Constitutional: Vital signs are normal. He appears well-developed and well-nourished. He is not diaphoretic.  Non-toxic appearance. He does not appear ill. No distress.   HENT:   Head: Normocephalic and atraumatic.   Mouth/Throat: Oropharynx is clear and moist and mucous membranes are normal. Mucous membranes are not dry.   Eyes: Conjunctivae and lids are normal.   Neck: Neck supple.   Normal range of motion.  Cardiovascular:  Normal rate.           Pulmonary/Chest: No respiratory distress.   Musculoskeletal:      Right elbow: No swelling, deformity, effusion or lacerations. Decreased range of motion. Tenderness present in radial head and lateral epicondyle.      Right forearm: Tenderness present. No swelling, edema, deformity or bony tenderness.      Cervical back: Normal range of motion and neck supple.      Comments: Radial pulses 2+.    Forearm flexion and extension, wrist flexion-extension, hand  and finger abduction all 5/5    Right elbow:  Patient unable to fully extend arm without pain though with passive     Neurological: He is alert and oriented to person, place, and time.   Skin: Skin is dry and intact. No pallor.   Psychiatric: He has a normal mood and affect. His speech is normal and behavior is normal.          ED Course   Procedures  Labs Reviewed   CBC W/ AUTO DIFFERENTIAL - Abnormal; Notable for the following components:       Result Value    MCH 31.1 (*)     All other components within normal limits   COMPREHENSIVE METABOLIC PANEL - Abnormal; Notable for the following components:     (*)     ALT 79 (*)     Anion Gap 6 (*)     All other components within normal limits   HIV 1 / 2 ANTIBODY    Narrative:     Release to patient->Immediate   HEPATITIS C ANTIBODY    Narrative:     Release to patient->Immediate   SEDIMENTATION RATE   C-REACTIVE PROTEIN          Imaging Results              X-Ray Elbow Complete Right (Final result)  Result time 08/11/23 13:00:58      Final result by Rafael Zacarias III, MD (08/11/23 13:00:58)                   Narrative:    EXAMINATION:  XR ELBOW COMPLETE 3 VIEW RIGHT    CLINICAL HISTORY:  Pain in right elbow    FINDINGS:  Three views elbow right: No fracture dislocation bone destruction or joint effusion seen.      Electronically signed by: Rafael Zacarias MD  Date:    08/11/2023  Time:    13:00                                     Medications   ibuprofen tablet 400 mg (400 mg Oral Given 8/11/23 1158)     Medical Decision Making:   History:   Old Medical Records: I decided to obtain old medical records.  Old Records Summarized: records from clinic visits.  Initial Assessment:   23-year-old male, complex past medical history as stated above, now here with right atraumatic elbow/proximal forearm pain the exam relatively benign    Patient afebrile normal vital signs, exam as noted  Differential Diagnosis:   Tendinitis/overuse syndrome  Septic arthritis considered however seems very unlikely based on exam  Autoimmune inflammatory arthritis  Independently Interpreted Test(s):   I have ordered and independently interpreted X-rays - see prior notes.  Clinical Tests:   Lab Tests: Reviewed and Ordered  Radiological Study: Reviewed and Ordered  ED Management:  Labs including CBC, CMP,  ESR, CRP  X-rays  Pain control and reassess    ED workup unremarkable including negative x-ray, normal ESR and CRP.  No signs of joint effusion on x-ray.  Explained to patient that I do think it is safe for him to go home, possibly a tendinitis syndrome however we discussed ED return precautions and need for very close follow-up in the orthopedic surgery clinic.  Patient verbalized understanding of these plans, mom at bedside throughout our discussion.                          Clinical Impression:   Final diagnoses:  [M25.521] Elbow pain, right (Primary)        ED Disposition Condition    Discharge Stable          ED Prescriptions    None       Follow-up Information       Follow up With Specialties Details Why Contact Info Additional Information    [facility] 5th Fl Orthopedics Call   [address]  [facility] [address] [phone] [facility] - Main Building, 5th Floor Please park in [facility] and take [address] elevator             Mena Quigley MD  08/11/23 9283

## 2023-09-05 ENCOUNTER — PATIENT MESSAGE (OUTPATIENT)
Dept: HEMATOLOGY/ONCOLOGY | Facility: CLINIC | Age: 23
End: 2023-09-05
Payer: COMMERCIAL

## 2023-10-17 ENCOUNTER — PATIENT MESSAGE (OUTPATIENT)
Dept: HEMATOLOGY/ONCOLOGY | Facility: CLINIC | Age: 23
End: 2023-10-17
Payer: COMMERCIAL

## 2023-10-17 DIAGNOSIS — M54.9 DORSALGIA, UNSPECIFIED: Primary | ICD-10-CM

## 2023-11-01 ENCOUNTER — TELEPHONE (OUTPATIENT)
Dept: ENDOSCOPY | Facility: HOSPITAL | Age: 23
End: 2023-11-01
Payer: COMMERCIAL

## 2023-11-01 ENCOUNTER — PATIENT MESSAGE (OUTPATIENT)
Dept: ENDOSCOPY | Facility: HOSPITAL | Age: 23
End: 2023-11-01
Payer: COMMERCIAL

## 2023-11-01 ENCOUNTER — LAB VISIT (OUTPATIENT)
Dept: LAB | Facility: HOSPITAL | Age: 23
End: 2023-11-01
Attending: INTERNAL MEDICINE
Payer: COMMERCIAL

## 2023-11-01 ENCOUNTER — TELEPHONE (OUTPATIENT)
Dept: HEMATOLOGY/ONCOLOGY | Facility: CLINIC | Age: 23
End: 2023-11-01
Payer: COMMERCIAL

## 2023-11-01 ENCOUNTER — OFFICE VISIT (OUTPATIENT)
Dept: HEMATOLOGY/ONCOLOGY | Facility: CLINIC | Age: 23
End: 2023-11-01
Payer: COMMERCIAL

## 2023-11-01 VITALS
BODY MASS INDEX: 37.31 KG/M2 | SYSTOLIC BLOOD PRESSURE: 121 MMHG | WEIGHT: 281.5 LBS | HEIGHT: 73 IN | DIASTOLIC BLOOD PRESSURE: 71 MMHG | OXYGEN SATURATION: 98 % | RESPIRATION RATE: 18 BRPM | HEART RATE: 73 BPM

## 2023-11-01 DIAGNOSIS — D3A.8 NEUROENDOCRINE TUMOR: Primary | ICD-10-CM

## 2023-11-01 DIAGNOSIS — Z80.9 FAMILY HISTORY OF CARCINOID TUMOR: ICD-10-CM

## 2023-11-01 DIAGNOSIS — D3A.8 NEUROENDOCRINE TUMOR OF PANCREAS: ICD-10-CM

## 2023-11-01 DIAGNOSIS — R10.12 LEFT UPPER QUADRANT PAIN: ICD-10-CM

## 2023-11-01 DIAGNOSIS — D3A.8 NEUROENDOCRINE TUMOR OF PANCREAS: Primary | ICD-10-CM

## 2023-11-01 LAB
ALBUMIN SERPL BCP-MCNC: 3.9 G/DL (ref 3.5–5.2)
ALP SERPL-CCNC: 62 U/L (ref 55–135)
ALT SERPL W/O P-5'-P-CCNC: 55 U/L (ref 10–44)
ANION GAP SERPL CALC-SCNC: 10 MMOL/L (ref 8–16)
AST SERPL-CCNC: 33 U/L (ref 10–40)
BASOPHILS # BLD AUTO: 0.03 K/UL (ref 0–0.2)
BASOPHILS NFR BLD: 0.5 % (ref 0–1.9)
BILIRUB SERPL-MCNC: 0.9 MG/DL (ref 0.1–1)
BUN SERPL-MCNC: 11 MG/DL (ref 6–20)
CALCIUM SERPL-MCNC: 9 MG/DL (ref 8.7–10.5)
CHLORIDE SERPL-SCNC: 103 MMOL/L (ref 95–110)
CO2 SERPL-SCNC: 24 MMOL/L (ref 23–29)
CREAT SERPL-MCNC: 1.2 MG/DL (ref 0.5–1.4)
DIFFERENTIAL METHOD: ABNORMAL
EOSINOPHIL # BLD AUTO: 0.1 K/UL (ref 0–0.5)
EOSINOPHIL NFR BLD: 1.9 % (ref 0–8)
ERYTHROCYTE [DISTWIDTH] IN BLOOD BY AUTOMATED COUNT: 12.6 % (ref 11.5–14.5)
EST. GFR  (NO RACE VARIABLE): >60 ML/MIN/1.73 M^2
GLUCOSE SERPL-MCNC: 157 MG/DL (ref 70–110)
HCT VFR BLD AUTO: 46.9 % (ref 40–54)
HGB BLD-MCNC: 15.8 G/DL (ref 14–18)
IMM GRANULOCYTES # BLD AUTO: 0.05 K/UL (ref 0–0.04)
IMM GRANULOCYTES NFR BLD AUTO: 0.8 % (ref 0–0.5)
LYMPHOCYTES # BLD AUTO: 2 K/UL (ref 1–4.8)
LYMPHOCYTES NFR BLD: 32.4 % (ref 18–48)
MCH RBC QN AUTO: 30.4 PG (ref 27–31)
MCHC RBC AUTO-ENTMCNC: 33.7 G/DL (ref 32–36)
MCV RBC AUTO: 90 FL (ref 82–98)
MONOCYTES # BLD AUTO: 0.4 K/UL (ref 0.3–1)
MONOCYTES NFR BLD: 6.9 % (ref 4–15)
NEUTROPHILS # BLD AUTO: 3.6 K/UL (ref 1.8–7.7)
NEUTROPHILS NFR BLD: 57.5 % (ref 38–73)
NRBC BLD-RTO: 0 /100 WBC
PLATELET # BLD AUTO: 197 K/UL (ref 150–450)
PMV BLD AUTO: 10.8 FL (ref 9.2–12.9)
POTASSIUM SERPL-SCNC: 3.6 MMOL/L (ref 3.5–5.1)
PROT SERPL-MCNC: 6.5 G/DL (ref 6–8.4)
RBC # BLD AUTO: 5.2 M/UL (ref 4.6–6.2)
SODIUM SERPL-SCNC: 137 MMOL/L (ref 136–145)
WBC # BLD AUTO: 6.24 K/UL (ref 3.9–12.7)

## 2023-11-01 PROCEDURE — 3078F DIAST BP <80 MM HG: CPT | Mod: CPTII,S$GLB,, | Performed by: INTERNAL MEDICINE

## 2023-11-01 PROCEDURE — 3008F PR BODY MASS INDEX (BMI) DOCUMENTED: ICD-10-PCS | Mod: CPTII,S$GLB,, | Performed by: INTERNAL MEDICINE

## 2023-11-01 PROCEDURE — 1160F RVW MEDS BY RX/DR IN RCRD: CPT | Mod: CPTII,S$GLB,, | Performed by: INTERNAL MEDICINE

## 2023-11-01 PROCEDURE — 83519 RIA NONANTIBODY: CPT | Performed by: INTERNAL MEDICINE

## 2023-11-01 PROCEDURE — 83497 ASSAY OF 5-HIAA: CPT | Performed by: INTERNAL MEDICINE

## 2023-11-01 PROCEDURE — 1159F PR MEDICATION LIST DOCUMENTED IN MEDICAL RECORD: ICD-10-PCS | Mod: CPTII,S$GLB,, | Performed by: INTERNAL MEDICINE

## 2023-11-01 PROCEDURE — 36415 COLL VENOUS BLD VENIPUNCTURE: CPT | Performed by: INTERNAL MEDICINE

## 2023-11-01 PROCEDURE — 1159F MED LIST DOCD IN RCRD: CPT | Mod: CPTII,S$GLB,, | Performed by: INTERNAL MEDICINE

## 2023-11-01 PROCEDURE — 80053 COMPREHEN METABOLIC PANEL: CPT | Performed by: INTERNAL MEDICINE

## 2023-11-01 PROCEDURE — 99214 OFFICE O/P EST MOD 30 MIN: CPT | Mod: S$GLB,,, | Performed by: INTERNAL MEDICINE

## 2023-11-01 PROCEDURE — 3074F PR MOST RECENT SYSTOLIC BLOOD PRESSURE < 130 MM HG: ICD-10-PCS | Mod: CPTII,S$GLB,, | Performed by: INTERNAL MEDICINE

## 2023-11-01 PROCEDURE — 1160F PR REVIEW ALL MEDS BY PRESCRIBER/CLIN PHARMACIST DOCUMENTED: ICD-10-PCS | Mod: CPTII,S$GLB,, | Performed by: INTERNAL MEDICINE

## 2023-11-01 PROCEDURE — 99214 PR OFFICE/OUTPT VISIT, EST, LEVL IV, 30-39 MIN: ICD-10-PCS | Mod: S$GLB,,, | Performed by: INTERNAL MEDICINE

## 2023-11-01 PROCEDURE — 99999 PR PBB SHADOW E&M-EST. PATIENT-LVL IV: CPT | Mod: PBBFAC,,, | Performed by: INTERNAL MEDICINE

## 2023-11-01 PROCEDURE — 99999 PR PBB SHADOW E&M-EST. PATIENT-LVL IV: ICD-10-PCS | Mod: PBBFAC,,, | Performed by: INTERNAL MEDICINE

## 2023-11-01 PROCEDURE — 84260 ASSAY OF SEROTONIN: CPT | Performed by: INTERNAL MEDICINE

## 2023-11-01 PROCEDURE — 3008F BODY MASS INDEX DOCD: CPT | Mod: CPTII,S$GLB,, | Performed by: INTERNAL MEDICINE

## 2023-11-01 PROCEDURE — 3078F PR MOST RECENT DIASTOLIC BLOOD PRESSURE < 80 MM HG: ICD-10-PCS | Mod: CPTII,S$GLB,, | Performed by: INTERNAL MEDICINE

## 2023-11-01 PROCEDURE — 85025 COMPLETE CBC W/AUTO DIFF WBC: CPT | Performed by: INTERNAL MEDICINE

## 2023-11-01 PROCEDURE — 3074F SYST BP LT 130 MM HG: CPT | Mod: CPTII,S$GLB,, | Performed by: INTERNAL MEDICINE

## 2023-11-01 NOTE — TELEPHONE ENCOUNTER
Spoke to patient to schedule procedure(s) Upper Endoscopy Ultrasound (EUS)/EGD       Physician to perform procedure(s) Dr. CORINNE Aguilar  Date of Procedure (s) 11/30/23  Arrival Time 7:45 AM  Time of Procedure(s) 8:45 AM   Location of Procedure(s) [address]  Type of Rx Prep sent to patient: Other  Instructions provided to patient via [facility]    Patient was informed on the following information and verbalized understanding. Screening questionnaire reviewed with patient and complete. If procedure requires anesthesia, a responsible adult needs to be present to accompany the patient home, patient cannot drive after receiving anesthesia. Appointment details are tentative, especially check-in time. Patient will receive a prep-op call 4 days prior to confirm check-in time for procedure. If applicable the patient should contact their pharmacy to verify Rx for procedure prep is ready for pick-up. Patient was advised to call the scheduling department at [phone] if pharmacy states no Rx is available. Patient was advised to call the endoscopy scheduling department if any questions or concerns arise.      [facility] Scheduling Department

## 2023-11-01 NOTE — TELEPHONE ENCOUNTER
----- Message from Gurpreet Carrion sent at 11/1/2023  9:32 AM CDT -----  Good morning,    Can someone reach out to this pt and get his scheduled in your clinic please?              Thank you!!  Gurpreet      [facility]'s office hours are as follows:    Monday 8:00 am - 4:30 pm  Tuesday 8:00 am - 4:30 pm  Friday 7:30 am - 4:30 pm    *If you need a refill after today's visit, please contact your pharmacy directly.    If [facility] has ordered any blood work; please complete it 3-5 days prior to your next appointment, unless otherwise instructed.    Lab Hours:  Monday-Thursday 7-6 Friday 7-5 Saturday 7-12  Closed on Holidays    If your labs are fasting, please do not eat any food or drink any beverages for 12 hours before having the testing done. You may have water only; NO candy, gum, mints, coffee, soda or juice.  Please take all medications as usual. Do not drink any alcoholic beverages for 24 hours prior to testing.    Please allow 5-7 business days for the results to be sent and reviewed by your provider.  You may receive test results in [facility] before your physician has had a chance to review them.   If your results are critical and require more immediate intervention, you will be contacted sooner. Your results will be conveyed via phone call, letter or at your next scheduled appointment.

## 2023-11-01 NOTE — PROGRESS NOTES
PROGRESS NOTE    Subjective:       Patient ID: Homer Beatty is a 23 y.o. male.    Chief Complaint: resected pancreatic NET    Diagnosis:   Well differentiated low grade pancreatic NET    Oncologic History:       Oncologic History Pancreatic neuroendocrine tumor diagnosed 2017     Oncologic Treatment Distal pancreatectomy 10/17/17 (Dr. Aguilar)     Pathology Well differentiated neuroendocrine tumor of the pancreas, grade 1, Ki-67 less than 1%, pT2, N1, M0      Subjective:    Interval History:   Mr Beatty returns for follow up. His history dates to approximately 2017 when he was undergoing a physical exam for high school athletics when it was noted that he had abdominal tenderness.  Further workup ensued leading to abdominal imaging which had revealed a 4.5 cm focal area of fullness in the tail of the pancreas.  An endoscopic ultrasound was performed revealing a low-grade neuroendocrine tumor.  There is no evidence of distant disease on imaging.  In 10/2017 he underwent a distal pancreatectomy.  Pathology had revealed a well-differentiated neuroendocrine tumor, grade 1 with negative margins.  The tumor was 5.1 cm with mitoses less than 2/10 HPF and Ki-67 less than 1%.  Necrosis was not identified nor was lymphovascular or perineural invasion.  2 out of 4 lymph nodes were positive for metastatic neuroendocrine tumor.  He also underwent a liver biopsy at that time showing mild macrosteatosis without evidence of hepatocellular damage or metastatic disease.  Of note he has a history of autoimmune hepatitis diagnosed at approximately age 6 and also a family history of a carcinoid tumor in his paternal grandfather.  Gallium-68 PET/CT in 2017 was negative for distant disease. MEN-1 and VHL testing were negative.    Mr Beatty returns for follow up. He is feeling well. He has no new complaints today.    ECO    ROS:   A ten-point system review is  obtained and negative except for what was stated in the Interval History.     Physical Examination:   Vital signs reviewed.   General: well hydrated, well developed, in no acute distress  HEENT: normocephalic, PERRLA, EOMI, anicteric sclerae, oropharynx clear. +facial redness  Neck: supple, no JVD, thyromegaly, cervical or supraclavicular lymphadenopathy  Lungs: clear breath sounds bilaterally, no wheezing, rales, or rhonchi  Heart: RRR, no M/R/G  Abdomen: soft, no tenderness, non-distended, no hepatosplenomegaly, mass, or hernia. BS present  Extremities: no clubbing, cyanosis, or edema  Skin: generalized redness over face, neck, abdomen, bilateral arms and legs. On compression skin color returns to normal. No ulcer, or open wounds  Neuro: alert and oriented x 4, no focal neuro deficit  Psych: pleasant and appropriate mood and affect    Objective:     Laboratory Data:  Labs reviewed. CBC, CMP unremarkable    Imaging Data:  MRI abdomen 8/4/22:  Impression:     Normal MRI.  No significant abnormality seen.     Hepatosplenomegaly.       Assessment and Plan:     1. Neuroendocrine tumor of pancreas    2. Family history of carcinoid tumor    3. Left upper quadrant pain        1.2  - Mr Beatty is a 24 yo man with pathologic T2N1 well differentiated low grade NET s/p distal pancreatectomy in 10/2017  - doing well.   - f/u on biomarker results  - continue with surveillance  - his grandfather also has neuroendocrine tumor. Aunt positive for BRCA. But both grandparents are negative. He was referred to genetics, needs to make appointment  - has new LUQ pain. Will get EGD/EUS, CT A/P. Refer to GI      Follow-up:     CT A/P, EGD/EUS for abdominal pain. Refer to GI  Knows to call in the interval if any problems arise.    Discussed with Dr. Dallas DO  PGY-VI  Hematology/Oncology Fellow    ATTESTATION:    I have personally seen, examined and talked to the patient. I have reviewed Dr Anderson's note and agreed with the  findings, assessment and plan.   Patient has been having left upper quadrant abdominal pain radiating to the back intermittently over the past few weeks. Occurs after he eats or drinks. Will get CT A/P and EGD/EUS for further evaluation. Refer to GI for further evaluation. Messaged Dr Clemente Gilmore MD  Hematology and Medical Oncology  [facility]      Route Chart for Scheduling    Med Onc Chart Routing  Urgent    Follow up with physician 1 year. please schedule patient to see GI. see me in one year with CBC, CMP, serotonin, pancreastatin, 5-HIAA, CT A/P done 3 weeks prior to return. Monday appts preferred   Follow up with [facility]    Infusion scheduling note    Injection scheduling note    Labs CBC and CMP   Scheduling:  Preferred lab:  Lab interval:  serotonini, pancreastatin, 5-HIAA   Imaging Other   first available CT A/P   Pharmacy appointment    Other referrals

## 2023-11-03 LAB — 5OH-INDOLEACETATE SERPL-MCNC: 7 NG/ML

## 2023-11-06 ENCOUNTER — HOSPITAL ENCOUNTER (OUTPATIENT)
Dept: RADIOLOGY | Facility: HOSPITAL | Age: 23
Discharge: HOME | End: 2023-11-06
Attending: INTERNAL MEDICINE
Payer: COMMERCIAL

## 2023-11-06 DIAGNOSIS — D3A.8 NEUROENDOCRINE TUMOR OF PANCREAS: ICD-10-CM

## 2023-11-06 LAB — SEROTONIN: 81 NG/ML

## 2023-11-06 PROCEDURE — 74177 CT ABD & PELVIS W/CONTRAST: CPT | Mod: 26,,, | Performed by: RADIOLOGY

## 2023-11-06 PROCEDURE — 74177 CT ABDOMEN PELVIS WITH IV CONTRAST: ICD-10-PCS | Mod: 26,,, | Performed by: RADIOLOGY

## 2023-11-06 PROCEDURE — A9698 NON-RAD CONTRAST MATERIALNOC: HCPCS | Performed by: INTERNAL MEDICINE

## 2023-11-06 PROCEDURE — 74177 CT ABD & PELVIS W/CONTRAST: CPT | Mod: TC

## 2023-11-06 PROCEDURE — 25500020 PHARM REV CODE 255: Performed by: INTERNAL MEDICINE

## 2023-11-06 RX ADMIN — Medication 450 ML: at 02:11

## 2023-11-06 RX ADMIN — IOHEXOL 100 ML: 350 INJECTION, SOLUTION INTRAVENOUS at 02:11

## 2023-11-10 LAB — PANCREASTATIN SERPL-MCNC: 53 PG/ML (ref 10–135)

## 2024-02-15 ENCOUNTER — PATIENT MESSAGE (OUTPATIENT)
Dept: PRIMARY CARE CLINIC | Facility: CLINIC | Age: 24
End: 2024-02-15
Payer: COMMERCIAL

## 2024-02-15 DIAGNOSIS — Z00.00 ANNUAL PHYSICAL EXAM: Primary | ICD-10-CM

## 2024-02-15 NOTE — TELEPHONE ENCOUNTER
Pt last seen 7/2021. Okay to keep upcoming appt as 15 minute established visit or reschedule as NP?

## 2024-02-19 ENCOUNTER — LAB VISIT (OUTPATIENT)
Dept: LAB | Facility: HOSPITAL | Age: 24
End: 2024-02-19
Attending: INTERNAL MEDICINE
Payer: COMMERCIAL

## 2024-02-19 DIAGNOSIS — Z00.00 ANNUAL PHYSICAL EXAM: ICD-10-CM

## 2024-02-19 LAB
ALBUMIN SERPL BCP-MCNC: 4.1 G/DL (ref 3.5–5.2)
ALP SERPL-CCNC: 66 U/L (ref 55–135)
ALT SERPL W/O P-5'-P-CCNC: 59 U/L (ref 10–44)
ANION GAP SERPL CALC-SCNC: 7 MMOL/L (ref 8–16)
AST SERPL-CCNC: 38 U/L (ref 10–40)
BASOPHILS # BLD AUTO: 0.05 K/UL (ref 0–0.2)
BASOPHILS NFR BLD: 0.7 % (ref 0–1.9)
BILIRUB SERPL-MCNC: 1.1 MG/DL (ref 0.1–1)
BUN SERPL-MCNC: 13 MG/DL (ref 6–20)
CALCIUM SERPL-MCNC: 9.7 MG/DL (ref 8.7–10.5)
CHLORIDE SERPL-SCNC: 107 MMOL/L (ref 95–110)
CHOLEST SERPL-MCNC: 164 MG/DL (ref 120–199)
CHOLEST/HDLC SERPL: 4.8 {RATIO} (ref 2–5)
CO2 SERPL-SCNC: 27 MMOL/L (ref 23–29)
CREAT SERPL-MCNC: 1.1 MG/DL (ref 0.5–1.4)
DIFFERENTIAL METHOD BLD: ABNORMAL
EOSINOPHIL # BLD AUTO: 0.1 K/UL (ref 0–0.5)
EOSINOPHIL NFR BLD: 1.7 % (ref 0–8)
ERYTHROCYTE [DISTWIDTH] IN BLOOD BY AUTOMATED COUNT: 13 % (ref 11.5–14.5)
EST. GFR  (NO RACE VARIABLE): >60 ML/MIN/1.73 M^2
GLUCOSE SERPL-MCNC: 77 MG/DL (ref 70–110)
HCT VFR BLD AUTO: 51 % (ref 40–54)
HDLC SERPL-MCNC: 34 MG/DL (ref 40–75)
HDLC SERPL: 20.7 % (ref 20–50)
HGB BLD-MCNC: 17.4 G/DL (ref 14–18)
IMM GRANULOCYTES # BLD AUTO: 0.07 K/UL (ref 0–0.04)
IMM GRANULOCYTES NFR BLD AUTO: 0.9 % (ref 0–0.5)
LDLC SERPL CALC-MCNC: 111 MG/DL (ref 63–159)
LYMPHOCYTES # BLD AUTO: 2.4 K/UL (ref 1–4.8)
LYMPHOCYTES NFR BLD: 31.4 % (ref 18–48)
MCH RBC QN AUTO: 32 PG (ref 27–31)
MCHC RBC AUTO-ENTMCNC: 34.1 G/DL (ref 32–36)
MCV RBC AUTO: 94 FL (ref 82–98)
MONOCYTES # BLD AUTO: 0.7 K/UL (ref 0.3–1)
MONOCYTES NFR BLD: 9.1 % (ref 4–15)
NEUTROPHILS # BLD AUTO: 4.3 K/UL (ref 1.8–7.7)
NEUTROPHILS NFR BLD: 56.2 % (ref 38–73)
NONHDLC SERPL-MCNC: 130 MG/DL
NRBC BLD-RTO: 0 /100 WBC
PLATELET # BLD AUTO: 251 K/UL (ref 150–450)
PMV BLD AUTO: 11.7 FL (ref 9.2–12.9)
POTASSIUM SERPL-SCNC: 4.1 MMOL/L (ref 3.5–5.1)
PROT SERPL-MCNC: 7 G/DL (ref 6–8.4)
RBC # BLD AUTO: 5.44 M/UL (ref 4.6–6.2)
SODIUM SERPL-SCNC: 141 MMOL/L (ref 136–145)
TRIGL SERPL-MCNC: 95 MG/DL (ref 30–150)
WBC # BLD AUTO: 7.62 K/UL (ref 3.9–12.7)

## 2024-02-19 PROCEDURE — 80053 COMPREHEN METABOLIC PANEL: CPT | Performed by: INTERNAL MEDICINE

## 2024-02-19 PROCEDURE — 85025 COMPLETE CBC W/AUTO DIFF WBC: CPT | Performed by: INTERNAL MEDICINE

## 2024-02-19 PROCEDURE — 36415 COLL VENOUS BLD VENIPUNCTURE: CPT | Performed by: INTERNAL MEDICINE

## 2024-02-19 PROCEDURE — 80061 LIPID PANEL: CPT | Performed by: INTERNAL MEDICINE

## 2024-02-20 ENCOUNTER — OFFICE VISIT (OUTPATIENT)
Dept: PRIMARY CARE CLINIC | Facility: CLINIC | Age: 24
End: 2024-02-20
Payer: COMMERCIAL

## 2024-02-20 VITALS
OXYGEN SATURATION: 98 % | RESPIRATION RATE: 18 BRPM | SYSTOLIC BLOOD PRESSURE: 110 MMHG | BODY MASS INDEX: 37.4 KG/M2 | DIASTOLIC BLOOD PRESSURE: 80 MMHG | TEMPERATURE: 97 F | HEART RATE: 71 BPM | HEIGHT: 73 IN | WEIGHT: 282.19 LBS

## 2024-02-20 DIAGNOSIS — Z00.00 ANNUAL PHYSICAL EXAM: Primary | ICD-10-CM

## 2024-02-20 PROCEDURE — 1160F RVW MEDS BY RX/DR IN RCRD: CPT | Mod: CPTII,S$GLB,, | Performed by: INTERNAL MEDICINE

## 2024-02-20 PROCEDURE — 3008F BODY MASS INDEX DOCD: CPT | Mod: CPTII,S$GLB,, | Performed by: INTERNAL MEDICINE

## 2024-02-20 PROCEDURE — 99395 PREV VISIT EST AGE 18-39: CPT | Mod: S$GLB,,, | Performed by: INTERNAL MEDICINE

## 2024-02-20 PROCEDURE — 3079F DIAST BP 80-89 MM HG: CPT | Mod: CPTII,S$GLB,, | Performed by: INTERNAL MEDICINE

## 2024-02-20 PROCEDURE — 3074F SYST BP LT 130 MM HG: CPT | Mod: CPTII,S$GLB,, | Performed by: INTERNAL MEDICINE

## 2024-02-20 PROCEDURE — 99999 PR PBB SHADOW E&M-EST. PATIENT-LVL IV: CPT | Mod: PBBFAC,,, | Performed by: INTERNAL MEDICINE

## 2024-02-20 PROCEDURE — 1159F MED LIST DOCD IN RCRD: CPT | Mod: CPTII,S$GLB,, | Performed by: INTERNAL MEDICINE

## 2024-02-20 NOTE — PROGRESS NOTES
[facility] Note    Chief Complaint      Chief Complaint   Patient presents with    Annual Exam       History of Present Illness      Homer Beatty is a 23 y.o. male who presents today for   Chief Complaint   Patient presents with    Annual Exam   .  Patient comes to appointment here for annual preventative visit with me .he had full labs done prior to visit all reviewed with patient by me . He is   Getting back to working out at gym for exercise and is trying to eat healthier.   HPI    No problem-specific Assessment & Plan notes found for this encounter.       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, selective     New onset of headaches 02/12/2018    Pancreatic cyst 05/07/2018    Regional lymph node metastasis present 11/01/2017       Past Surgical History:  Past Surgical History:   Procedure Laterality Date    CIRCUMCISION      LIVER BIOPSY  3/15/2013    PANCREATECTOMY      TONSILLECTOMY, ADENOIDECTOMY         Family History:  family history includes Breast cancer in his paternal aunt; Cancer in his maternal aunt, maternal grandfather, paternal aunt, and paternal grandfather; Colon polyps in his paternal grandfather; Diabetes in his maternal grandmother; Heart disease in his maternal grandmother; Hypertension in his maternal grandmother; No Known Problems in his brother, brother, father, mother, and paternal grandmother.     Social History:  Social History     Socioeconomic History    Marital status:  Single   Tobacco Use    Smoking status: Never    Smokeless tobacco: Never   Substance and Sexual Activity    Alcohol use: No    Drug use: No    Sexual activity: Never   Social History Narrative    2 cats and a dog        Lives with mom (Viky), and 2  Brother.  Also goes to parents.        Attends Brother [employer] 9th grade        Mother smokes.       Review of Systems:   Review of Systems   Constitutional:  Negative for fever and weight loss.   HENT:  Negative for congestion, hearing loss and sore throat.    Eyes:  Negative for blurred vision.   Respiratory:  Negative for cough and shortness of breath.    Cardiovascular:  Negative for chest pain, palpitations, claudication and leg swelling.   Gastrointestinal:  Negative for abdominal pain, constipation, diarrhea, heartburn, nausea and vomiting.   Genitourinary:  Negative for dysuria.   Musculoskeletal:  Negative for back pain and myalgias.   Skin:  Negative for rash.   Neurological:  Negative for focal weakness and headaches.   Psychiatric/Behavioral:  Negative for depression, memory loss and suicidal ideas. The patient is not nervous/anxious.         Medications:  Outpatient Encounter Medications as of 2/20/2024   Medication Sig Dispense Refill    fish oil-omega-3 fatty acids 300-1,000 mg capsule Take 2 g by mouth once daily.      [DISCONTINUED] metoclopramide HCl (REGLAN) 10 MG tablet Take 1 tablet (10 mg total) by mouth every 6 (six) hours as needed. (Patient not taking: Reported on 2/20/2024) 30 tablet 0    [DISCONTINUED] ondansetron (ZOFRAN-ODT) 4 MG TbDL Take 1 tablet (4 mg total) by mouth every 6 (six) hours as needed. (Patient not taking: Reported on 11/1/2023) 10 tablet 0     No facility-administered encounter medications on file as of 2/20/2024.       Allergies:  Review of patient's allergies indicates:   Allergen Reactions    Tylenol [acetaminophen]      Patient has Autoimmune Hepatitis/Takes 6MP    Latex, natural rubber Rash         Physical Exam     "  Vitals:    02/20/24 1047   BP: 110/80   Pulse: 71   Resp: 18   Temp: 97 °F (36.1 °C)        Vital Signs  Temp: 97 °F (36.1 °C)  Temp Source: Oral  Pulse: 71  Resp: 18  SpO2: 98 %  BP: 110/80  BP Location: Right arm  Patient Position: Sitting  Pain Score: 0-No pain  Height and Weight  Height: 6' 1" (185.4 cm)  Weight: 128 kg (282 lb 3 oz)  BSA (Calculated - sq m): 2.57 sq meters  BMI (Calculated): 37.2  Weight in (lb) to have BMI = 25: 189.1]     Body mass index is 37.23 kg/m².    Physical Exam  Constitutional:       Appearance: He is well-developed.   HENT:      Head: Normocephalic.   Eyes:      Pupils: Pupils are equal, round, and reactive to light.   Neck:      Thyroid: No thyromegaly.   Cardiovascular:      Rate and Rhythm: Normal rate and regular rhythm.      Heart sounds: No murmur heard.     No friction rub. No gallop.   Pulmonary:      Effort: Pulmonary effort is normal.      Breath sounds: Normal breath sounds.   Abdominal:      General: Bowel sounds are normal.      Palpations: Abdomen is soft.   Musculoskeletal:         General: Normal range of motion.      Cervical back: Normal range of motion.   Skin:     General: Skin is warm and dry.   Neurological:      Mental Status: He is alert and oriented to person, place, and time.      Sensory: No sensory deficit.   Psychiatric:         Behavior: Behavior normal.          Laboratory:  CBC:  Recent Labs   Lab Result Units 02/19/24  1119   WBC K/uL 7.62   RBC M/uL 5.44   Hemoglobin g/dL 17.4   Hematocrit % 51.0   Platelets K/uL 251   MCV fL 94   MCH pg 32.0*   MCHC g/dL 34.1     CMP:  Recent Labs   Lab Result Units 02/19/24  1119   Glucose mg/dL 77   Calcium mg/dL 9.7   Albumin g/dL 4.1   Total Protein g/dL 7.0   Sodium mmol/L 141   Potassium mmol/L 4.1   CO2 mmol/L 27   Chloride mmol/L 107   BUN mg/dL 13   Alkaline Phosphatase U/L 66   ALT U/L 59*   AST U/L 38   Total Bilirubin mg/dL 1.1*     URINALYSIS:  No results for input(s): "COLORU", "CLARITYU", " ""SPECGRAV", "PHUR", "PROTEINUA", "GLUCOSEU", "BILIRUBINCON", "BLOODU", "WBCU", "RBCU", "BACTERIA", "MUCUS", "NITRITE", "LEUKOCYTESUR", "UROBILINOGEN", "HYALINECASTS" in the last 2160 hours.   LIPIDS:  Recent Labs   Lab Result Units 02/19/24  1119   HDL mg/dL 34*   Cholesterol mg/dL 164   Triglycerides mg/dL 95   LDL Cholesterol mg/dL 111.0   HDL/Cholesterol Ratio % 20.7   Non-HDL Cholesterol mg/dL 130   Total Cholesterol/HDL Ratio  4.8     TSH:  No results for input(s): "TSH" in the last 2160 hours.  A1C:  No results for input(s): "HGBA1C" in the last 2160 hours.    Radiology:        Assessment:     Homer Beatty is a 23 y.o.male with:    Annual physical exam                Plan:     Problem List Items Addressed This Visit          Other    Annual physical exam - Primary    Overview     Pe documented all screening labs reviewed w patient today and look good             As above, continue current medications and maintain follow up with specialists.  Return to clinic in 12 months.      Frederick W Dantagnan [facility]                  "

## 2024-05-01 ENCOUNTER — OFFICE VISIT (OUTPATIENT)
Dept: INTERNAL MEDICINE | Facility: CLINIC | Age: 24
End: 2024-05-01
Payer: COMMERCIAL

## 2024-05-01 VITALS
TEMPERATURE: 98 F | OXYGEN SATURATION: 96 % | DIASTOLIC BLOOD PRESSURE: 82 MMHG | HEART RATE: 76 BPM | WEIGHT: 289.25 LBS | BODY MASS INDEX: 38.33 KG/M2 | SYSTOLIC BLOOD PRESSURE: 108 MMHG | RESPIRATION RATE: 13 BRPM | HEIGHT: 73 IN

## 2024-05-01 DIAGNOSIS — R05.9 COUGH IN ADULT: ICD-10-CM

## 2024-05-01 DIAGNOSIS — E66.9 OBESITY (BMI 30-39.9): ICD-10-CM

## 2024-05-01 DIAGNOSIS — R09.82 POSTNASAL DRIP: ICD-10-CM

## 2024-05-01 DIAGNOSIS — J01.90 ACUTE BACTERIAL SINUSITIS: Primary | ICD-10-CM

## 2024-05-01 DIAGNOSIS — B96.89 ACUTE BACTERIAL SINUSITIS: Primary | ICD-10-CM

## 2024-05-01 PROCEDURE — 3074F SYST BP LT 130 MM HG: CPT | Mod: CPTII,S$GLB,, | Performed by: NURSE PRACTITIONER

## 2024-05-01 PROCEDURE — 99214 OFFICE O/P EST MOD 30 MIN: CPT | Mod: S$GLB,,, | Performed by: NURSE PRACTITIONER

## 2024-05-01 PROCEDURE — 3079F DIAST BP 80-89 MM HG: CPT | Mod: CPTII,S$GLB,, | Performed by: NURSE PRACTITIONER

## 2024-05-01 PROCEDURE — 1159F MED LIST DOCD IN RCRD: CPT | Mod: CPTII,S$GLB,, | Performed by: NURSE PRACTITIONER

## 2024-05-01 PROCEDURE — 99999 PR PBB SHADOW E&M-EST. PATIENT-LVL IV: CPT | Mod: PBBFAC,,, | Performed by: NURSE PRACTITIONER

## 2024-05-01 PROCEDURE — 3008F BODY MASS INDEX DOCD: CPT | Mod: CPTII,S$GLB,, | Performed by: NURSE PRACTITIONER

## 2024-05-01 RX ORDER — FLUTICASONE PROPIONATE 50 MCG
1 SPRAY, SUSPENSION (ML) NASAL 2 TIMES DAILY
Qty: 18.2 ML | Refills: 0 | Status: SHIPPED | OUTPATIENT
Start: 2024-05-01

## 2024-05-01 RX ORDER — PROMETHAZINE HYDROCHLORIDE AND DEXTROMETHORPHAN HYDROBROMIDE 6.25; 15 MG/5ML; MG/5ML
5 SYRUP ORAL EVERY 6 HOURS PRN
Qty: 240 ML | Refills: 0 | Status: SHIPPED | OUTPATIENT
Start: 2024-05-01

## 2024-05-01 RX ORDER — AZITHROMYCIN 250 MG/1
TABLET, FILM COATED ORAL
Qty: 6 TABLET | Refills: 0 | Status: SHIPPED | OUTPATIENT
Start: 2024-05-01 | End: 2024-05-06

## 2024-05-01 NOTE — PROGRESS NOTES
"Subjective     Patient ID: Homer Beatty is a 24 y.o. male.    Chief Complaint: Cough    Pt of Dr. Renteria, here for, "bad cough for over a week"      Cough  This is a recurrent problem. The current episode started 1 to 4 weeks ago (3 weeks). The problem has been gradually worsening. The problem occurs every few hours. The cough is Productive of sputum (mostly dry but then white mucus). Associated symptoms include chest pain, myalgias, postnasal drip and a sore throat. Pertinent negatives include no chills, ear congestion, ear pain, fever, headaches, heartburn, nasal congestion, rhinorrhea, shortness of breath, sweats, weight loss or wheezing. The symptoms are aggravated by lying down. Risk factors: sleeps with fan, cat in home. He has tried OTC cough suppressant (Mucinex otc) for the symptoms. The treatment provided no relief. His past medical history is significant for environmental allergies. There is no history of asthma, bronchiectasis, bronchitis, COPD, emphysema or pneumonia.   Review of Systems   Constitutional:  Negative for chills, fever and weight loss.   HENT:  Positive for postnasal drip and sore throat. Negative for nasal congestion, drooling, ear pain, rhinorrhea, sinus pressure/congestion and sneezing.    Eyes:  Negative for pain.   Respiratory:  Positive for cough. Negative for chest tightness, shortness of breath and wheezing.    Cardiovascular:  Positive for chest pain.   Gastrointestinal:  Negative for abdominal pain, constipation, diarrhea, heartburn, nausea and vomiting.   Genitourinary:  Negative for dysuria.   Musculoskeletal:  Positive for myalgias.   Allergic/Immunologic: Positive for environmental allergies. Negative for food allergies and frequent infections.        Sleeps with fan, cat in home   Neurological:  Negative for dizziness, weakness and headaches.   Hematological:  Negative for adenopathy. Does not bruise/bleed easily.   Psychiatric/Behavioral:  Negative for suicidal " ideas.             Review of patient's allergies indicates:   Allergen Reactions    Tylenol [acetaminophen]      Patient has Autoimmune Hepatitis/Takes 6MP    Latex, natural rubber Rash       No current outpatient medications on file.    Patient Active Problem List   Diagnosis    Autoimmune hepatitis    Kyphosis    Body mass index, pediatric, greater than or equal to 95th percentile for age    Neuroendocrine tumor of pancreas    Family history of carcinoid tumor    Regional lymph node metastasis present    Adjustment disorder with anxious mood    New onset of headaches    IgA deficiency    Atypical chest pain    Pancreatic cyst    DANIELS (nonalcoholic steatohepatitis)    Hepatic fibrosis, early fibrosis    Class 2 obesity due to excess calories without serious comorbidity with body mass index (BMI) of 37.0 to 37.9 in adult    Annual physical exam       Past Medical History:   Diagnosis Date    Annual physical exam 2/20/2024    Autoimmune hepatitis     Family history of carcinoid tumor 11/01/2017    Fever blister     Fibrosis of liver     Genetic testing 11/2017    MEN1 and VHL negative    History of malignant neuroendocrine tumor 08/21/2020    IgA deficiency, selective     New onset of headaches 02/12/2018    Pancreatic cyst 05/07/2018    Regional lymph node metastasis present 11/01/2017       Past Surgical History:   Procedure Laterality Date    CIRCUMCISION      LIVER BIOPSY  3/15/2013    PANCREATECTOMY      TONSILLECTOMY, ADENOIDECTOMY         Social History     Socioeconomic History    Marital status: Single   Tobacco Use    Smoking status: Never    Smokeless tobacco: Never   Substance and Sexual Activity    Alcohol use: No    Drug use: No    Sexual activity: Never   Social History Narrative    2 cats and a dog        Lives with mom (Viky), and 2  Brother.  Also goes to parents.        Attends Brother [employer] 9th grade        Mother smokes.       Family History   Problem Relation Name Age of Onset    No Known  "Problems Mother      No Known Problems Father      Cancer Maternal Grandfather          unknown    No Known Problems Brother      Breast cancer Paternal Aunt      Cancer Paternal Aunt          breast (BRCA2)    Heart disease Maternal Grandmother      Hypertension Maternal Grandmother      Diabetes Maternal Grandmother      No Known Problems Paternal Grandmother      Cancer Paternal Grandfather          Small intestine carcinoid    Colon polyps Paternal Grandfather      No Known Problems Brother      Cancer Maternal Aunt          breast    Melanoma Neg Hx      Lupus Neg Hx      Psoriasis Neg Hx      Congenital heart disease Neg Hx      Pacemaker/defibrilator Neg Hx      Early death Neg Hx         Objective     Vitals:    05/01/24 1016   BP: 108/82   Pulse: 76   Resp: 13   Temp: 98.2 °F (36.8 °C)   TempSrc: Oral   SpO2: 96%   Weight: 131.2 kg (289 lb 3.9 oz)   Height: 6' 1" (1.854 m)   PainSc: 0-No pain       Body mass index is 38.16 kg/m².    Physical Exam  Vitals and nursing note reviewed.   Constitutional:       Appearance: He is obese.   HENT:      Head: Normocephalic.      Right Ear: Tympanic membrane, ear canal and external ear normal. There is no impacted cerumen.      Left Ear: Tympanic membrane, ear canal and external ear normal. There is no impacted cerumen.      Nose: Mucosal edema, congestion and rhinorrhea present.      Right Turbinates: Enlarged.      Left Turbinates: Enlarged.      Right Sinus: No maxillary sinus tenderness or frontal sinus tenderness.      Left Sinus: No maxillary sinus tenderness or frontal sinus tenderness.      Mouth/Throat:      Mouth: Mucous membranes are moist.      Pharynx: Oropharynx is clear. No oropharyngeal exudate or posterior oropharyngeal erythema.      Comments: Clear PND noted on exam      Eyes:      Conjunctiva/sclera: Conjunctivae normal.   Cardiovascular:      Rate and Rhythm: Normal rate and regular rhythm.      Heart sounds: Normal heart sounds.   Pulmonary:      " Effort: Pulmonary effort is normal.      Breath sounds: Normal breath sounds.   Musculoskeletal:         General: Normal range of motion.      Cervical back: Normal range of motion and neck supple.   Lymphadenopathy:      Cervical: No cervical adenopathy.   Skin:     General: Skin is warm and dry.   Neurological:      General: No focal deficit present.      Mental Status: He is alert and oriented to person, place, and time.   Psychiatric:         Mood and Affect: Mood normal.         Behavior: Behavior normal.         Thought Content: Thought content normal.         Judgment: Judgment normal.            Assessment and Plan     1. Acute bacterial sinusitis  -     azithromycin (Z-AILEEN) 250 MG tablet; Take 2 tablets by mouth on day 1; Take 1 tablet by mouth on days 2-5  Dispense: 6 tablet; Refill: 0    2. Postnasal drip  -     promethazine-dextromethorphan (PROMETHAZINE-DM) 6.25-15 mg/5 mL Syrp; Take 5 mLs by mouth every 6 (six) hours as needed (cough and congestion).  Dispense: 240 mL; Refill: 0  -     fluticasone propionate (FLONASE) 50 mcg/actuation nasal spray; 1 spray (50 mcg total) by Each Nostril route 2 (two) times daily.  Dispense: 18.2 mL; Refill: 0    3. Cough in adult  -     promethazine-dextromethorphan (PROMETHAZINE-DM) 6.25-15 mg/5 mL Syrp; Take 5 mLs by mouth every 6 (six) hours as needed (cough and congestion).  Dispense: 240 mL; Refill: 0    4. BMI 38.0-38.9,adult  BMI reviewed    5. Obesity (BMI 30-39.9)  BMI reviewed.    Diet and exercise to lose weight.    Meds as prescribed    Rest and Fluids, Tylenol or Motrin otc as needed for pain and or fever    Warm liquids to thin mucus    Allergen avoidance discussed, humidified air recommended    Warm salt water gargles as needed for throat pain    Nasal spray, taught how to correctly use    Self care instructions provided in AVS      Follow up if symptoms worsen or fail to improve.

## 2024-05-03 DIAGNOSIS — D3A.8 NEUROENDOCRINE TUMOR OF PANCREAS: Primary | ICD-10-CM

## 2024-05-27 PROBLEM — Z00.00 ANNUAL PHYSICAL EXAM: Status: RESOLVED | Noted: 2024-02-20 | Resolved: 2024-05-27

## 2024-07-26 ENCOUNTER — OFFICE VISIT (OUTPATIENT)
Dept: PRIMARY CARE CLINIC | Facility: CLINIC | Age: 24
End: 2024-07-26
Payer: COMMERCIAL

## 2024-07-26 VITALS
OXYGEN SATURATION: 98 % | WEIGHT: 295.19 LBS | HEIGHT: 73 IN | SYSTOLIC BLOOD PRESSURE: 118 MMHG | TEMPERATURE: 98 F | RESPIRATION RATE: 18 BRPM | HEART RATE: 84 BPM | DIASTOLIC BLOOD PRESSURE: 80 MMHG | BODY MASS INDEX: 39.12 KG/M2

## 2024-07-26 DIAGNOSIS — G47.33 OSA (OBSTRUCTIVE SLEEP APNEA): Primary | ICD-10-CM

## 2024-07-26 PROCEDURE — 99999 PR PBB SHADOW E&M-EST. PATIENT-LVL IV: CPT | Mod: PBBFAC,,, | Performed by: INTERNAL MEDICINE

## 2024-07-26 NOTE — PROGRESS NOTES
[facility] Note    Chief Complaint      Chief Complaint   Patient presents with    Annual Exam    Insomnia       History of Present Illness      Homer Beatty is a 24 y.o. male who presents today for   Chief Complaint   Patient presents with    Annual Exam    Insomnia   .  Patient comes to appointment here for visit related to newly diagnosed ethel . He had physical for his employer [employer] . At that physical he was referred for sleep study and has been diagnosed with ethel . He needs to be reassessed with [facility] as a result of his insurance coverage .    HPI    No problem-specific Assessment & Plan notes found for this encounter.       Problem List Items Addressed This Visit          Other    ETHEL (obstructive sleep apnea) - Primary    Overview     Will refer to sleep medicine              Past Medical History:  Past Medical History:   Diagnosis Date    Annual physical exam 2/20/2024    Autoimmune hepatitis     Family history of carcinoid tumor 11/01/2017    Fever blister     Fibrosis of liver     Genetic testing 11/2017    MEN1 and VHL negative    History of malignant neuroendocrine tumor 08/21/2020    IgA deficiency, selective     New onset of headaches 02/12/2018    Pancreatic cyst 05/07/2018    Regional lymph node metastasis present 11/01/2017       Past Surgical History:  Past Surgical History:   Procedure Laterality Date    CIRCUMCISION      LIVER BIOPSY  3/15/2013    PANCREATECTOMY      TONSILLECTOMY, ADENOIDECTOMY         Family History:  family history includes Breast cancer in his paternal aunt; Cancer in his maternal aunt, maternal grandfather, paternal aunt, and paternal grandfather; Colon polyps in his paternal grandfather; Diabetes in his maternal grandmother; Heart disease in his maternal grandmother; Hypertension in his maternal grandmother; No Known Problems in his brother, brother, father, mother, and paternal grandmother.     Social History:  Social History      Socioeconomic History    Marital status: Single   Tobacco Use    Smoking status: Never    Smokeless tobacco: Never   Substance and Sexual Activity    Alcohol use: No    Drug use: No    Sexual activity: Never   Social History Narrative    2 cats and a dog        Lives with mom (Viky), and 2  Brother.  Also goes to parents.        Attends Brother [employer] 9th grade        Mother smokes.       Review of Systems:   Review of Systems   Constitutional:  Negative for fever and weight loss.   HENT:  Negative for congestion, hearing loss and sore throat.    Eyes:  Negative for blurred vision.   Respiratory:  Negative for cough and shortness of breath.    Cardiovascular:  Negative for chest pain, palpitations, claudication and leg swelling.   Gastrointestinal:  Negative for abdominal pain, constipation, diarrhea and heartburn.   Genitourinary:  Negative for dysuria.   Musculoskeletal:  Negative for back pain and myalgias.   Skin:  Negative for rash.   Neurological:  Negative for focal weakness and headaches.   Psychiatric/Behavioral:  Negative for depression and suicidal ideas. The patient is not nervous/anxious.         Medications:  Outpatient Encounter Medications as of 7/26/2024   Medication Sig Dispense Refill    [DISCONTINUED] fluticasone propionate (FLONASE) 50 mcg/actuation nasal spray 1 spray (50 mcg total) by Each Nostril route 2 (two) times daily. 18.2 mL 0    [DISCONTINUED] promethazine-dextromethorphan (PROMETHAZINE-DM) 6.25-15 mg/5 mL Syrp Take 5 mLs by mouth every 6 (six) hours as needed (cough and congestion). (Patient not taking: Reported on 7/26/2024) 240 mL 0     No facility-administered encounter medications on file as of 7/26/2024.       Allergies:  Review of patient's allergies indicates:   Allergen Reactions    Tylenol [acetaminophen]      Patient has Autoimmune Hepatitis/Takes 6MP    Latex, natural rubber Rash         Physical Exam      Vitals:    07/26/24 1413   BP: 118/80   Pulse: 84   Resp: 18  "  Temp: 98 °F (36.7 °C)        Vital Signs  Temp: 98 °F (36.7 °C)  Temp Source: Oral  Pulse: 84  Resp: 18  SpO2: 98 %  BP: 118/80  BP Location: Right arm  Patient Position: Sitting  Pain Score: 0-No pain  Height and Weight  Height: 6' 1" (185.4 cm)  Weight: 133.9 kg (295 lb 3.1 oz)  BSA (Calculated - sq m): 2.63 sq meters  BMI (Calculated): 39  Weight in (lb) to have BMI = 25: 189.1]     Body mass index is 38.95 kg/m².    Physical Exam  Constitutional:       Appearance: He is well-developed.   HENT:      Head: Normocephalic.   Eyes:      Pupils: Pupils are equal, round, and reactive to light.   Neck:      Thyroid: No thyromegaly.   Cardiovascular:      Rate and Rhythm: Normal rate and regular rhythm.      Heart sounds: No murmur heard.     No friction rub. No gallop.   Pulmonary:      Effort: Pulmonary effort is normal.      Breath sounds: Normal breath sounds.   Abdominal:      General: Bowel sounds are normal.      Palpations: Abdomen is soft.   Musculoskeletal:         General: Normal range of motion.      Cervical back: Normal range of motion.   Skin:     General: Skin is warm and dry.   Neurological:      Mental Status: He is alert and oriented to person, place, and time.      Sensory: No sensory deficit.   Psychiatric:         Behavior: Behavior normal.          Laboratory:  CBC:  No results for input(s): "WBC", "RBC", "HGB", "HCT", "PLT", "MCV", "MCH", "MCHC" in the last 2160 hours.  CMP:  No results for input(s): "GLU", "CALCIUM", "ALBUMIN", "PROT", "NA", "K", "CO2", "CL", "BUN", "ALKPHOS", "ALT", "AST", "BILITOT" in the last 2160 hours.    Invalid input(s): "CREATININ"  URINALYSIS:  No results for input(s): "COLORU", "CLARITYU", "SPECGRAV", "PHUR", "PROTEINUA", "GLUCOSEU", "BILIRUBINCON", "BLOODU", "WBCU", "RBCU", "BACTERIA", "MUCUS", "NITRITE", "LEUKOCYTESUR", "UROBILINOGEN", "HYALINECASTS" in the last 2160 hours.   LIPIDS:  No results for input(s): "TSH", "HDL", "CHOL", "TRIG", "LDLCALC", "CHOLHDL", " ""NONHDLCHOL", "TOTALCHOLEST" in the last 2160 hours.  TSH:  No results for input(s): "TSH" in the last 2160 hours.  A1C:  No results for input(s): "HGBA1C" in the last 2160 hours.    Radiology:        Assessment:     Homer Beatty is a 24 y.o.male with:    ETHEL (obstructive sleep apnea)                Plan:     Problem List Items Addressed This Visit          Other    ETHEL (obstructive sleep apnea) - Primary    Overview     Will refer to sleep medicine             As above, continue current medications and maintain follow up with specialists.  Return to clinic in 6 months.      Frederick W Dantagnan [facility]                  "

## 2024-10-04 ENCOUNTER — PATIENT MESSAGE (OUTPATIENT)
Dept: PRIMARY CARE CLINIC | Facility: CLINIC | Age: 24
End: 2024-10-04
Payer: COMMERCIAL

## 2024-10-24 ENCOUNTER — OFFICE VISIT (OUTPATIENT)
Dept: SLEEP MEDICINE | Facility: CLINIC | Age: 24
End: 2024-10-24
Payer: COMMERCIAL

## 2024-10-24 VITALS
SYSTOLIC BLOOD PRESSURE: 115 MMHG | BODY MASS INDEX: 37.99 KG/M2 | DIASTOLIC BLOOD PRESSURE: 82 MMHG | HEART RATE: 80 BPM | HEIGHT: 73 IN | WEIGHT: 286.63 LBS

## 2024-10-24 DIAGNOSIS — R35.1 NOCTURIA: ICD-10-CM

## 2024-10-24 DIAGNOSIS — F51.09 OTHER INSOMNIA NOT DUE TO A SUBSTANCE OR KNOWN PHYSIOLOGICAL CONDITION: ICD-10-CM

## 2024-10-24 DIAGNOSIS — G47.10 HYPERSOMNOLENCE: ICD-10-CM

## 2024-10-24 DIAGNOSIS — G25.81 RESTLESS LEG SYNDROME: ICD-10-CM

## 2024-10-24 DIAGNOSIS — G47.33 OSA (OBSTRUCTIVE SLEEP APNEA): Primary | ICD-10-CM

## 2024-10-24 PROCEDURE — 99204 OFFICE O/P NEW MOD 45 MIN: CPT | Mod: S$GLB,,, | Performed by: PHYSICIAN ASSISTANT

## 2024-10-24 PROCEDURE — 3008F BODY MASS INDEX DOCD: CPT | Mod: CPTII,S$GLB,, | Performed by: PHYSICIAN ASSISTANT

## 2024-10-24 PROCEDURE — 99999 PR PBB SHADOW E&M-EST. PATIENT-LVL III: CPT | Mod: PBBFAC,,, | Performed by: PHYSICIAN ASSISTANT

## 2024-10-24 PROCEDURE — 1159F MED LIST DOCD IN RCRD: CPT | Mod: CPTII,S$GLB,, | Performed by: PHYSICIAN ASSISTANT

## 2024-10-24 PROCEDURE — 3074F SYST BP LT 130 MM HG: CPT | Mod: CPTII,S$GLB,, | Performed by: PHYSICIAN ASSISTANT

## 2024-10-24 PROCEDURE — 3079F DIAST BP 80-89 MM HG: CPT | Mod: CPTII,S$GLB,, | Performed by: PHYSICIAN ASSISTANT

## 2024-10-24 NOTE — PROGRESS NOTES
Referred by Torsten Renteria*     NEW PATIENT VISIT    Homer Beatty  is a pleasant 24 y.o. male  with PMH significant for headaches, neuroendocrine tumor of pancrease, DANIELS, autoimmune hepatitis, hepatic fibrosis, , adjustment disorder, BMI 37+, ETHEL who presents for ETHEL management following referral from PCP      Reports dx Severe ETHEL after HST was ordered as part of a work physical (AHI 32.7). [facility] for insurance purposes needs to establish care with sleep medicine through [facility], which is why he presents today. Does endorse sx of waking with dry mouth, poor disrupted and un-refreshing sleep, and excessive sleepiness and fatigue. Does endorse working shift work. Not currently using black out curtains. Does endorse light occasionally causing sleep disruptions when trying to sleep during the day. Denies snoring or witnessed apneas (no witness to sleep). Denies sleep walking/talking. Denies dream enactment behavior. Does endorse sx of RSL about 1-2 x weekly. Constant urge to move legs, cannot get comfortable. Improvement with movement. With mother in clinic today      SLEEP SCHEDULE   Environment    Bed Time varies   Sleep Latency 1-2hours (gets better sleep during night hours)   Arousals 1 (much more if sleeping during the day)   Nocturia 1   Back to sleep Usually quick   Wake time varies   Naps Occasional 30min afternoon nap   Work Shift work       Past Medical History:   Diagnosis Date    Annual physical exam 2/20/2024    Autoimmune hepatitis     Family history of carcinoid tumor 11/01/2017    Fever blister     Fibrosis of liver     Genetic testing 11/2017    MEN1 and VHL negative    History of malignant neuroendocrine tumor 08/21/2020    IgA deficiency, selective     New onset of headaches 02/12/2018    Pancreatic cyst 05/07/2018    Regional lymph node metastasis present 11/01/2017     Patient Active Problem List   Diagnosis    Autoimmune hepatitis    Kyphosis    Body mass index, pediatric,  greater than or equal to 95th percentile for age    Neuroendocrine tumor of pancreas    Family history of carcinoid tumor    Regional lymph node metastasis present    Adjustment disorder with anxious mood    New onset of headaches    IgA deficiency    Atypical chest pain    Pancreatic cyst    DANIELS (nonalcoholic steatohepatitis)    Hepatic fibrosis, early fibrosis    Class 2 obesity due to excess calories without serious comorbidity with body mass index (BMI) of 37.0 to 37.9 in adult    ETHEL (obstructive sleep apnea)     No current outpatient medications on file.     There were no vitals filed for this visit.    Physical Exam:    GEN:   Well-appearing  Psych:  Appropriate affect, demonstrates insight  SKIN:  No rash on the face or bridge of the nose      LABS:   Lab Results   Component Value Date    HGB 16.3 10/07/2024    CO2 25 10/07/2024         RECORDS REVIEWED:    6/3/24 HST (scanned into media): AHI 32.7, kenny 81%    ASSESSMENT    [place] Sleepiness Scale:  Sitting and readin  Watching TV:    1  Passenger in a car x 1 hr:  2  Sitting quietly after lunch:  2  Lying down to rest in PM:  3  Sitting, inactive in public:  1  Sitting+ talking to someone:  0  Stopped in traffic:   0  Total        PROBLEM DESCRIPTION/ Sx on Presentation  STATUS   Hx Severe ETHEL   unsure snoring, denies arousals, denies witnessed apneas  + wakes with dry mouth  HST  AHI 32  New   Daytime Sx   + sleepiness when inactive   Denies drowsiness when driving   ESS  on intake  New   Insomnia   Trouble falling asleep: 1-2hours  Arousals:         1-3  Hard to get back to sleep?: no issues    Prior pertinent medications:  Current pertinent medications:   New   Nocturia   x 1 per sleep period  New   RLS Does endorse sx of RSL about 1-2 x weekly  Constant urge to move legs, cannot get comfortable  Improvement with movement   Ferritin  215  New   Other issues:       PLAN      -discussed HST results with patient in  detail  -recommended trial of CPAP (gold standard) as tx for ETHEL, patient is open  -CPAP and supplies ordered  -discussed ETHEL and PAP with patient in detail, including possible complications of untreated ETHEL like heart attack/stroke  -advised on strict driving precautions; advised never to drive drowsy  -discussed shift work and sleep hygiene  -recommended blackout curtains/eye mask for daytime sleeping     Advised on plan of care. Answered all patient questions. Patient verbalized understanding and voiced agreement with plan of care.     RTC  will need follow up 31-90 days after receiving CPAP machine for compliance         The patient was given open opportunity to ask questions and/or express concerns about treatment plan. All questions/concerns were discussed.     Two patient identifiers used prior to evaluation.

## 2024-10-28 ENCOUNTER — OFFICE VISIT (OUTPATIENT)
Dept: HEMATOLOGY/ONCOLOGY | Facility: CLINIC | Age: 24
End: 2024-10-28
Payer: COMMERCIAL

## 2024-10-28 VITALS
OXYGEN SATURATION: 98 % | HEIGHT: 73 IN | DIASTOLIC BLOOD PRESSURE: 60 MMHG | RESPIRATION RATE: 17 BRPM | TEMPERATURE: 98 F | WEIGHT: 290 LBS | BODY MASS INDEX: 38.43 KG/M2 | HEART RATE: 82 BPM | SYSTOLIC BLOOD PRESSURE: 125 MMHG

## 2024-10-28 DIAGNOSIS — Z80.9 FAMILY HISTORY OF CARCINOID TUMOR: ICD-10-CM

## 2024-10-28 DIAGNOSIS — K75.4 AUTOIMMUNE HEPATITIS: ICD-10-CM

## 2024-10-28 DIAGNOSIS — D3A.8 NEUROENDOCRINE TUMOR OF PANCREAS: Primary | ICD-10-CM

## 2024-10-28 DIAGNOSIS — K75.81 NASH (NONALCOHOLIC STEATOHEPATITIS): ICD-10-CM

## 2024-10-28 PROCEDURE — 3074F SYST BP LT 130 MM HG: CPT | Mod: CPTII,S$GLB,, | Performed by: INTERNAL MEDICINE

## 2024-10-28 PROCEDURE — 99214 OFFICE O/P EST MOD 30 MIN: CPT | Mod: S$GLB,,, | Performed by: INTERNAL MEDICINE

## 2024-10-28 PROCEDURE — 3008F BODY MASS INDEX DOCD: CPT | Mod: CPTII,S$GLB,, | Performed by: INTERNAL MEDICINE

## 2024-10-28 PROCEDURE — G2211 COMPLEX E/M VISIT ADD ON: HCPCS | Mod: S$GLB,,, | Performed by: INTERNAL MEDICINE

## 2024-10-28 PROCEDURE — 99999 PR PBB SHADOW E&M-EST. PATIENT-LVL IV: CPT | Mod: PBBFAC,,, | Performed by: INTERNAL MEDICINE

## 2024-10-28 PROCEDURE — 1159F MED LIST DOCD IN RCRD: CPT | Mod: CPTII,S$GLB,, | Performed by: INTERNAL MEDICINE

## 2024-10-28 PROCEDURE — 1160F RVW MEDS BY RX/DR IN RCRD: CPT | Mod: CPTII,S$GLB,, | Performed by: INTERNAL MEDICINE

## 2024-10-28 PROCEDURE — 3078F DIAST BP <80 MM HG: CPT | Mod: CPTII,S$GLB,, | Performed by: INTERNAL MEDICINE

## 2024-10-29 ENCOUNTER — TELEPHONE (OUTPATIENT)
Dept: HEMATOLOGY/ONCOLOGY | Facility: CLINIC | Age: 24
End: 2024-10-29
Payer: COMMERCIAL

## 2024-10-29 ENCOUNTER — PATIENT MESSAGE (OUTPATIENT)
Dept: HEMATOLOGY/ONCOLOGY | Facility: CLINIC | Age: 24
End: 2024-10-29
Payer: COMMERCIAL

## 2024-10-29 ENCOUNTER — TELEPHONE (OUTPATIENT)
Dept: HEPATOLOGY | Facility: CLINIC | Age: 24
End: 2024-10-29
Payer: COMMERCIAL

## 2024-10-29 DIAGNOSIS — D3A.8 NEUROENDOCRINE TUMOR OF PANCREAS: Primary | ICD-10-CM

## 2024-10-31 ENCOUNTER — TELEPHONE (OUTPATIENT)
Dept: HEMATOLOGY/ONCOLOGY | Facility: CLINIC | Age: 24
End: 2024-10-31
Payer: COMMERCIAL

## 2024-11-01 ENCOUNTER — OFFICE VISIT (OUTPATIENT)
Dept: HEMATOLOGY/ONCOLOGY | Facility: CLINIC | Age: 24
End: 2024-11-01
Payer: COMMERCIAL

## 2024-11-01 ENCOUNTER — LAB VISIT (OUTPATIENT)
Dept: LAB | Facility: HOSPITAL | Age: 24
End: 2024-11-01
Payer: COMMERCIAL

## 2024-11-01 DIAGNOSIS — D3A.8 NEUROENDOCRINE TUMOR OF PANCREAS: ICD-10-CM

## 2024-11-01 DIAGNOSIS — Z71.83 ENCOUNTER FOR NONPROCREATIVE GENETIC COUNSELING: Primary | ICD-10-CM

## 2024-11-01 DIAGNOSIS — Z80.3 FAMILY HISTORY OF BREAST CANCER: ICD-10-CM

## 2024-11-01 DIAGNOSIS — Z71.83 ENCOUNTER FOR NONPROCREATIVE GENETIC COUNSELING: ICD-10-CM

## 2024-11-01 LAB — MISCELLANEOUS GENETIC TEST NAME: NORMAL

## 2024-11-01 PROCEDURE — 36415 COLL VENOUS BLD VENIPUNCTURE: CPT | Performed by: GENETIC COUNSELOR, MS

## 2024-11-01 PROCEDURE — 99999 PR PBB SHADOW E&M-EST. PATIENT-LVL III: CPT | Mod: PBBFAC,,,

## 2024-12-09 ENCOUNTER — TELEPHONE (OUTPATIENT)
Dept: HEMATOLOGY/ONCOLOGY | Facility: CLINIC | Age: 24
End: 2024-12-09
Payer: COMMERCIAL

## 2024-12-09 NOTE — TELEPHONE ENCOUNTER
Cancer Genetics  Hereditary and High-Risk Clinic  Department of Hematology and Oncology  [facility]    [facility]    Patient ID  Name: Homer Beatty    : 2000    MRN: [id number]      IMPRESSION     Homer's panel genetic testing was negative for actionable mutations in 71 genes associated with hereditary brain, breast, colon, ovarian, pancreatic, prostate, renal, uterine, and other cancers. Results were disclosed over the phone on 2024.    DISCUSSION      Genetic Test Results      Homer had a sample submitted on 2024 to [facility] for BRCA1/2 Analyses with [employer] [employer] panel testing. This panel includes sequencing and/or deletion/duplication analysis of the following 71 genes: AIP, ALK, APC, KATHRYN, AXIN2, BAP1, BARD1, BMPR1A, BRCA1, BRCA2, BRIP1, CDC73, CDH1, CDK4, CDKN1B, CDKN2A, CHEK2, CTNNA1, DICER1, EGFR, EGLN1, EPCAM, FH, FLCN, GREM1, HOXB13, KIF1B, KIT, LZTR1, MAX, MEN1, MET, MITF, MLH1, MSH2, MSH3, MSH6, MUTYH, NF1, NF2, NTHL1, PALB2, PDGFRA, PHOX2B, PMS2, POLD1, POLE, POT1, METSO2G, PTCH1, PTEN, RAD51C, RAD51D, RB1, RET, SDHA, SDHAF2, SDHB, SDHC, SDHD, SMAD4, SMARCA4, SMARCB1, SMARCE1, STK11, SUFU, DRGB914, TP53, TSC1, TSC2, VHL.    Homer underwent analysis of the familial BRCA2 mutation that was previously identified in his paternal aunt. Homer's results were NEGATIVE for the familial BRCA2  mutation, meaning Homer did not inherit the familial BRCA2 mutation.    The results were negative for actionable mutations in any of these genes analyzed. This is considered a negative result, but it does not completely rule out a hereditary form of cancer in her family. Some individuals/families with cancer may have a mutation in a gene that is not included in this panel, and some may have mutations in one of these genes that is not detectable with our current technology. It could also be that his personal and family history is not due to a hereditary  cause.     Given his negative results, the likelihood of a hereditary form of cancer has been drastically reduced for Homer and his family. He has undergone comprehensive hereditary cancer genetic testing, and no further genetic testing is recommended at this time.    Cancer Risks and Screening Recommendations for Homer  There are no cancer screening recommendations based on his family history or genetic test results. I recommend he maintain his current pancreatic neuroendocrine tumor surveillance under the guidance of his oncologist, Dr. Gilmore.     He should follow general cancer screening recommendations for all other cancer screenings.      Recommendations for Family Members  I recommend his paternal family members have testing done for the known familial BRCA2 mutation as their cancer risks and screenings will depend on if they have the familial variant.     Besides that specific recommendation, his other relatives should follow the general cancer screening recommendations below.    General Cancer Screening Recommendations:      Breast: National Comprehensive Cancer Network Guidelines (NCCN) recommend individuals with average risk for breast cancer (<15%) have the following.   Breast awareness - be familiar with your breasts and report any changes to your healthcare providers.   <40 Clinical appointment every 1-3 years until age 40, preferably including a clinical breast exam.   Yearly clinical appointments starting at age 40, preferably including a clinical breast exam.   Yearly mammogram with tomosynthesis starting at age 40.  Consider supplemental screening for those with heterogenous or extremely dense breasts.     Cervical: American College of Obstetricians and Gynecologists (ACOG) and the US Preventative Services Task Force (USPSTF) recommend that people who have a cervix have the follow screening based on age. Individual risk factors and abnormal previous screening can alter these recommendations.   [Age  21-29] Pap test every 3 years.  [Age 30-65] Pap test and HPV test every 5 years OR Pap test every 3 years OR HPV test every 5 years.  [After age 65] No screening recommended if the individual has had adequate normal prior screening and does not have other high-risk factors.     Colorectal: The National Comprehensive Cancer Network (NCCN) and the US Preventative Services Task Force (USPSTF) recommend that people between age 45-75* at average risk** of colorectal cancer have screening through one of the following methods with the typical time until screening is needed again (for negative result/no polyps found) dependent on the method chosen. A positive result or identification of a polyp may change how often screening is recommended or what method of screening should be used.  Additionally, testing may be needed sooner for other reasons, including symptoms concerning for colorectal cancer.  Colonoscopy with the next screening in 10 years.   CT colonography with the next screening in 5 years.  Flexible sigmoidoscopy with the next screening in 5-10 years.  Stool testing (looking for blood such as a guaiac-based test or a fecal immunochemical test) with the next screening in 1 year.  Stool testing (DNA based) with the next screening in 3 years.  *Between age 76-85, screening should be selectively offered based on overall health, prior screening history, and patient preferences.   **Personal history of inflammatory bowel disease, cystic fibrosis, or polyps or family history of colorectal polyps or cancer may increase the risk of colorectal cancer and have different screening recommendations.    Prostate: The National Comprehensive Cancer Network (NCCN) recommends that individuals with an average risk of prostate cancer have a discussion with their doctors about the risks and benefits of prostate cancer early detection including prostate specific antigen (PSA) testing with or without digital rectal examination (SHAGUFTA)  starting at age 45. Follow up is based on age and findings.     Skin: The American Academy of Dermatology encourages regular skin self-exams and reporting any new spots to a healthcare provider.     References:  ACOG Cervical Cancer Screening Guidelines. Last updated April 2021. Reaffirmed April 2024. https://www.acog.org/clinical/clinical-guidance/practice-advisory/articles/2021/04/updated-cervical-cancer-screening-guidelines  National Comprehensive Cancer Network (NCCN). (2024). Breast Cancer Screening and Diagnosis. NCCN Clinical Practice Guidelines in Oncology (NCCN Guidelines), Version 2.2024.  National Comprehensive Cancer Network (NCCN). (2024). Prostate cancer early detection. NCCN Clinical Practice Guidelines in Oncology (NCCN Guidelines), Version 2.2024.  National Comprehensive Cancer Network (NCCN). (2023). Genetic/familial high-risk assessment: Colorectal. NCCN Clinical Practice Guidelines in Oncology (NCCN Guidelines), Version 2.2023.  USPSTF Final Recommendation Statement - Cervical Cancer: Screening. Last updated 8/21/2018. https://www.uspreventiveservicestaskforce.org/uspstf/recommendation/cervical-cancer-screening  USPSTF Final Recommendation Statement - Colorectal Cancer: Screening. Last updated 5/18/2021.  https://www.uspreventiveservicestaskforce.org/uspstf/recommendation/colorectal-cancer-screening      Homer received comprehensive genetic counseling regarding his negative genetic test results. Benefits and limitations were discussed, and he was provided with an electronic copy of her results report. Homer is encouraged to contact cancer genetics if there are any updates to his personal or family history, or if he has any questions or concerns.    This assessment is based on the history and reports provided, as well as the current scientific knowledge regarding cancer genetics.    Loraine Rodriguez, [place]  Genetic Counselor, Hereditary and High-Risk Clinic  Department of Hematology and  Oncology  [facility]

## 2025-01-07 ENCOUNTER — PROCEDURE VISIT (OUTPATIENT)
Dept: HEPATOLOGY | Facility: CLINIC | Age: 25
End: 2025-01-07
Payer: COMMERCIAL

## 2025-01-07 ENCOUNTER — OFFICE VISIT (OUTPATIENT)
Dept: HEPATOLOGY | Facility: CLINIC | Age: 25
End: 2025-01-07
Payer: COMMERCIAL

## 2025-01-07 VITALS — WEIGHT: 292.31 LBS | HEIGHT: 73 IN | BODY MASS INDEX: 38.74 KG/M2

## 2025-01-07 DIAGNOSIS — K75.81 METABOLIC DYSFUNCTION-ASSOCIATED STEATOHEPATITIS (MASH): Primary | ICD-10-CM

## 2025-01-07 DIAGNOSIS — K74.00 HEPATIC FIBROSIS: ICD-10-CM

## 2025-01-07 PROCEDURE — 91200 LIVER ELASTOGRAPHY: CPT | Mod: S$GLB,,, | Performed by: STUDENT IN AN ORGANIZED HEALTH CARE EDUCATION/TRAINING PROGRAM

## 2025-01-07 PROCEDURE — 1159F MED LIST DOCD IN RCRD: CPT | Mod: CPTII,S$GLB,, | Performed by: STUDENT IN AN ORGANIZED HEALTH CARE EDUCATION/TRAINING PROGRAM

## 2025-01-07 PROCEDURE — 1160F RVW MEDS BY RX/DR IN RCRD: CPT | Mod: CPTII,S$GLB,, | Performed by: STUDENT IN AN ORGANIZED HEALTH CARE EDUCATION/TRAINING PROGRAM

## 2025-01-07 PROCEDURE — 3008F BODY MASS INDEX DOCD: CPT | Mod: CPTII,S$GLB,, | Performed by: STUDENT IN AN ORGANIZED HEALTH CARE EDUCATION/TRAINING PROGRAM

## 2025-01-07 PROCEDURE — 99999 PR PBB SHADOW E&M-EST. PATIENT-LVL III: CPT | Mod: PBBFAC,,, | Performed by: STUDENT IN AN ORGANIZED HEALTH CARE EDUCATION/TRAINING PROGRAM

## 2025-01-07 PROCEDURE — 99213 OFFICE O/P EST LOW 20 MIN: CPT | Mod: S$GLB,,, | Performed by: STUDENT IN AN ORGANIZED HEALTH CARE EDUCATION/TRAINING PROGRAM

## 2025-01-07 NOTE — PROGRESS NOTES
Hepatology Follow Up Note    Referring provider: No ref. provider found  PCP: Torsten Renteria MD    Chief complaint: follow up MASH    HPI:  Homer Beatty is a 24 y.o. male with history of MASH who presents for follow up.    He is without complaints today.  No recent hospitalizations or ED visits. He denies signs of decompensated liver disease including no recent abdominal distension, encephalopathy, jaundice, GI bleeding.    Background (Reanna Jimenez, TERRY):   This is a 21 y.o. male referred for evaluation of autoimmune hepatitis and DANIELS.  He is here today with grandmother.      Autoimmune hepatitis diagnosed around age 6 or 7. Grandmother reports his liver was extremely enlarged at this time. He has been followed by Dr. Dusty Nieves (Peds GI at [facility]). On 6-MP for years and has had multiple liver biopsies.        6 MP has since been gradually weaned; stopped completely 3/2/21.     Liver biopsy recently completed at [facility] 7/21/21 (report scanned to media):  - Moderate steatosis with features of steatohepatitis (REYNA score 6 out of 8)  - Mild periportal fibrosis (stage 1)  - No significant portal inflammation  - No histiologic features of autoimmune hepatitis      His transaminases have been normal-mildly elevated, ALT 40s.  Synthetic function WNL.      Risk factors for fatty liver:     Weight -- Body mass index is 36.33 kg/m². Weight has been trending up over the last year; drinks large quantities of coke and sometimes sweet tea. Not following any particular diet or watching what he eats.                        Dyslipidemia -- mildly elevated cholesterol                              Insulin resistance / diabetes -- no HgbA1c for review         Hypertension -- no  Alcohol use -- no     He also has a history of NET of pancreas s/p partial pancreatectomy (2017). Of note, liver biopsy at time of pancreatectomy showed mild macrosteatosis (10-20%), no fibrosis. Followed by Dr. Hanson. Continues  to have annual MRI surveillance. Strong family h/o CA including carcinoid.     MRI 8/2/21- hepatosplenomegaly, hepatic steatosis, hyperintense focus in R hepatic lobe favored to represent sequela of prior liver biopsy      He feels well, no concerns.  No s/s hepatic decompensation.     Works for the [employer]. Trying to decide between a job here or in [place]. Wants to become a . May be gone for a few months if he goes to [place].     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, selective     New onset of headaches 02/12/2018    Pancreatic cyst 05/07/2018    Regional lymph node metastasis present 11/01/2017       Past Surgical History:   Procedure Laterality Date    CIRCUMCISION      LIVER BIOPSY  3/15/2013    PANCREATECTOMY      TONSILLECTOMY, ADENOIDECTOMY         Family History   Problem Relation Name Age of Onset    No Known Problems Mother David     Cancer Father Fernie 46        Spindle cell sarcoma on back, [place]: radiation    Cancer Maternal Grandfather Godwin 72        adrenal carcinoma    Breast cancer Paternal Aunt Aixa 39        BRCA2+    Heart disease Maternal Grandmother      Hypertension Maternal Grandmother      Diabetes Maternal Grandmother      Basal cell carcinoma Paternal Grandmother Viky 60 - 69        Nose    Cancer Paternal Grandfather Mo 63        small intestine NET    Colon polyps Paternal Grandfather Mo     Cancer Other James         adrenal tumor    Melanoma Neg Hx      Lupus Neg Hx      Psoriasis Neg Hx      Congenital heart disease Neg Hx      Pacemaker/defibrilator Neg Hx      Early death Neg Hx         Social History     Tobacco Use     "Smoking status: Never    Smokeless tobacco: Never   Substance Use Topics    Alcohol use: No    Drug use: No       No current outpatient medications on file.     No current facility-administered medications for this visit.       Review of patient's allergies indicates:   Allergen Reactions    Tylenol [acetaminophen]      Patient has Autoimmune Hepatitis/Takes 6MP    Latex, natural rubber Rash       Review of Systems   Constitutional:  Negative for fever and weight loss.   Cardiovascular:  Negative for leg swelling.   Gastrointestinal:  Negative for abdominal pain, blood in stool, constipation, diarrhea, heartburn, melena, nausea and vomiting.       Vitals:    01/07/25 1443   BP: (P) 135/80   Pulse: (P) 80   Resp: (P) 18   SpO2: (P) 96%   Weight: 132.6 kg (292 lb 5.3 oz)   Height: 6' 1" (1.854 m)         Physical Exam  Vitals reviewed.   Constitutional:       General: He is not in acute distress.  Eyes:      General: No scleral icterus.  Cardiovascular:      Rate and Rhythm: Normal rate and regular rhythm.   Pulmonary:      Effort: Pulmonary effort is normal. No respiratory distress.   Abdominal:      General: Bowel sounds are normal. There is no distension.      Palpations: Abdomen is soft.      Tenderness: There is no abdominal tenderness. There is no guarding or rebound.   Musculoskeletal:      Right lower leg: No edema.      Left lower leg: No edema.   Skin:     Coloration: Skin is not jaundiced.         LABS: I personally reviewed pertinent laboratory findings.    Lab Results   Component Value Date    WBC 5.66 10/07/2024    HGB 16.3 10/07/2024    HCT 48.7 10/07/2024    MCV 93 10/07/2024     10/07/2024       Lab Results   Component Value Date     10/07/2024    K 4.2 10/07/2024     10/07/2024    CO2 25 10/07/2024    BUN 11 10/07/2024    CREATININE 1.1 10/07/2024    CALCIUM 9.6 10/07/2024    ANIONGAP 8 10/07/2024    ESTGFRAFRICA >60.0 07/04/2022    EGFRNONAA >60.0 07/04/2022       Lab Results "   Component Value Date    ALT 78 (H) 10/07/2024    AST 47 (H) 10/07/2024    GGT 27 05/20/2019    ALKPHOS 63 10/07/2024    BILITOT 1.3 (H) 10/07/2024       Lab Results   Component Value Date    HEPAIGM Negative 04/20/2005    HEPBIGM Negative 04/20/2005    HEPCAB Non-reactive 08/11/2023       Lab Results   Component Value Date    JAN Negative 04/02/2009    SMOOTHMUSCAB Negative 04/02/2009    CERULOPLSM 25.8 04/02/2009        IMAGING: I personally reviewed imaging studies.    Assessment:  24 y.o. male with history of MASH who presents for scheduled follow up.  He has reported history of autoimmune hepatitis diagnosed as a child.  He was previously treated with 6 MP which was weaned off in in 03/2021.  Liver biopsy 07/2021 showed steatohepatitis and stage 1 fibrosis, but there were no features of autoimmune hepatitis.  FibroScan 01/2025 showed S3 steatosis and F1 fibrosis    1. Metabolic dysfunction-associated steatohepatitis (MASH)    2. Hepatic fibrosis        Recommendations:  - Counseled on diet, weight loss, and control of co-morbidities. Encouraged to lose 10% of his body weight over the next 12 months.    Return to clinic in 12 months with labs and FibroScan.    I spent a total of 20 minutes on the day of the visit. This includes face to face time and non-face to face time preparing to see the patient (eg, review of tests), obtaining and/or reviewing separately obtained history, documenting clinical information in the electronic or other health record, independently interpreting results, and communicating results to the patient/family/caregiver, or care coordination.    Mitch Miller MD  Staff Physician  Hepatology and Liver Transplant  [facility]  [facility]

## 2025-01-12 NOTE — PROCEDURES
FibroScan Transplant Hepatology    Date/Time: 1/7/2025 4:00 PM    Performed by: Mitch Miller MD  Authorized by: Mitch Miller MD    Diagnosis:  NAFLD    Probe:  XL    Universal Protocol: Patient's identity, procedure and site were verified, confirmatory pause was performed.  Discussed procedure including risks and potential complications.  Questions answered.  Patient verbalizes understanding and wishes to proceed with VCTE.     Procedure: After providing explanations of the procedure, patient was placed in the supine position with right arm in maximum abduction to allow optimal exposure of right lateral abdomen.  Patient was briefly assessed, Testing was performed in the mid-axillary location, 50Hz Shear Wave pulses were applied and the resulting Shear Wave and Propagation Speed detected with a 3.5 MHz ultrasonic signal, using the FibroScan probe, Skin to liver capsule distance and liver parenchyma were accessed during the entire examination with the FibroScan probe, Patient was instructed to breathe normally and to abstain from sudden movements during the procedure, allowing for random measurements of liver stiffness. At least 10 Shear Waves were produced, Individual measurements of each Shear Wave were calculated.  Patient tolerated the procedure well with no complications.  Meets discharge criteria as was dismissed.  Rates pain 0 out of 10.  Patient will follow up with ordering provider to review results.    Findings  Median liver stiffness score:  7.9  CAP Reading: dB/m:  364    IQR/med %:  11  Interpretation  Fibrosis interpretation is based on medial liver stiffness - Kilopascal (kPa).    Fibrosis Stage:  F 0-1  Steatosis interpretation is based on controlled attenuation parameter - (dB/m).    Steatosis Grade:  S3

## 2025-01-13 ENCOUNTER — TELEPHONE (OUTPATIENT)
Dept: HEPATOLOGY | Facility: CLINIC | Age: 25
End: 2025-01-13
Payer: COMMERCIAL

## 2025-01-13 NOTE — TELEPHONE ENCOUNTER
Recall placed in system.    Kaya    ----- Message from Mitch Miller MD sent at 1/12/2025  3:41 PM CST -----  Return 1 year with labs and FibroScan

## 2025-01-27 ENCOUNTER — OFFICE VISIT (OUTPATIENT)
Dept: PRIMARY CARE CLINIC | Facility: CLINIC | Age: 25
End: 2025-01-27
Payer: COMMERCIAL

## 2025-01-27 VITALS
RESPIRATION RATE: 18 BRPM | WEIGHT: 282.44 LBS | SYSTOLIC BLOOD PRESSURE: 118 MMHG | BODY MASS INDEX: 37.43 KG/M2 | OXYGEN SATURATION: 97 % | HEART RATE: 80 BPM | DIASTOLIC BLOOD PRESSURE: 70 MMHG | HEIGHT: 73 IN

## 2025-01-27 DIAGNOSIS — K75.81 NASH (NONALCOHOLIC STEATOHEPATITIS): ICD-10-CM

## 2025-01-27 DIAGNOSIS — D3A.8 NEUROENDOCRINE TUMOR OF PANCREAS: ICD-10-CM

## 2025-01-27 DIAGNOSIS — K75.4 AUTOIMMUNE HEPATITIS: Primary | ICD-10-CM

## 2025-01-27 DIAGNOSIS — G47.33 OSA (OBSTRUCTIVE SLEEP APNEA): ICD-10-CM

## 2025-01-27 PROCEDURE — 1159F MED LIST DOCD IN RCRD: CPT | Mod: CPTII,S$GLB,, | Performed by: INTERNAL MEDICINE

## 2025-01-27 PROCEDURE — 99999 PR PBB SHADOW E&M-EST. PATIENT-LVL III: CPT | Mod: PBBFAC,,, | Performed by: INTERNAL MEDICINE

## 2025-01-27 PROCEDURE — 3008F BODY MASS INDEX DOCD: CPT | Mod: CPTII,S$GLB,, | Performed by: INTERNAL MEDICINE

## 2025-01-27 PROCEDURE — 3078F DIAST BP <80 MM HG: CPT | Mod: CPTII,S$GLB,, | Performed by: INTERNAL MEDICINE

## 2025-01-27 PROCEDURE — 99214 OFFICE O/P EST MOD 30 MIN: CPT | Mod: S$GLB,,, | Performed by: INTERNAL MEDICINE

## 2025-01-27 PROCEDURE — 3074F SYST BP LT 130 MM HG: CPT | Mod: CPTII,S$GLB,, | Performed by: INTERNAL MEDICINE

## 2025-01-27 PROCEDURE — 1160F RVW MEDS BY RX/DR IN RCRD: CPT | Mod: CPTII,S$GLB,, | Performed by: INTERNAL MEDICINE

## 2025-01-27 NOTE — PROGRESS NOTES
[facility] Note    Chief Complaint      Chief Complaint   Patient presents with    Follow-up     6m       History of Present Illness      Homer Beatty is a 24 y.o. male who presents today for   Chief Complaint   Patient presents with    Follow-up     6m   .  Patient comes to appointment here for 6m checkup for chronic issues as below . He is currently compliant with all meds . He has been taking   Semaglutide for weight loss . And is getting regular exercise . He ahs lost 15 lbs in 2 weeks .   HPI    No problem-specific Assessment & Plan notes found for this encounter.       Problem List Items Addressed This Visit       Autoimmune hepatitis - Primary    Overview     Followed by GI at [facility].  Currently on 6MP         Neuroendocrine tumor of pancreas    DANIELS (nonalcoholic steatohepatitis)    ETHEL (obstructive sleep apnea)    Overview     Will refer to sleep medicine              Past Medical History:  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, selective     New onset of headaches 02/12/2018    Pancreatic cyst 05/07/2018    Regional lymph node metastasis present 11/01/2017       Past Surgical History:  Past Surgical History:   Procedure Laterality Date    CIRCUMCISION      LIVER BIOPSY  3/15/2013    PANCREATECTOMY      TONSILLECTOMY, ADENOIDECTOMY         Family History:  family history includes Basal cell carcinoma (age of onset: 60 - 69) in his paternal grandmother; Breast cancer (age of onset: 39) in his paternal aunt; Cancer in an other family member; Cancer (age of onset: 46) in his father; Cancer (age of onset: 63) in his paternal  grandfather; Cancer (age of onset: 72) in his maternal grandfather; Colon polyps in his paternal grandfather; Diabetes in his maternal grandmother; Heart disease in his maternal grandmother; Hypertension in his maternal grandmother; No Known Problems in his mother.     Social History:  Social History     Socioeconomic History    Marital status: Single   Tobacco Use    Smoking status: Never    Smokeless tobacco: Never   Substance and Sexual Activity    Alcohol use: No    Drug use: No    Sexual activity: Never   Social History Narrative    2 cats and a dog        Lives with mom (Viky), and 2  Brother.  Also goes to parents.        Attends Brother [employer] 9th grade        Mother smokes.       Review of Systems:   Review of Systems   Constitutional:  Negative for fever and weight loss.   HENT:  Negative for congestion, hearing loss and sore throat.    Eyes:  Negative for blurred vision.   Respiratory:  Negative for cough and shortness of breath.    Cardiovascular:  Negative for chest pain, palpitations, claudication and leg swelling.   Gastrointestinal:  Negative for abdominal pain, constipation, diarrhea and heartburn.   Genitourinary:  Negative for dysuria.   Musculoskeletal:  Negative for back pain and myalgias.   Skin:  Negative for rash.   Neurological:  Negative for focal weakness and headaches.   Psychiatric/Behavioral:  Negative for depression and suicidal ideas. The patient is not nervous/anxious.         Medications:  Outpatient Encounter Medications as of 1/27/2025   Medication Sig Dispense Refill    SEMAGLUTIDE SUBQ Inject into the skin.       No facility-administered encounter medications on file as of 1/27/2025.       Allergies:  Review of patient's allergies indicates:   Allergen Reactions    Tylenol [acetaminophen]      Patient has Autoimmune Hepatitis/Takes 6MP    Latex, natural rubber Rash         Physical Exam      Vitals:    01/27/25 1453   BP: 118/70   Pulse: 80   Resp: 18        Vital Signs  Pulse:  "80  Resp: 18  SpO2: 97 %  BP: 118/70  BP Location: Left arm  Patient Position: Sitting  Pain Score: 0-No pain  Height and Weight  Height: 6' 1" (185.4 cm)  Weight: 128.1 kg (282 lb 6.6 oz)  BSA (Calculated - sq m): 2.57 sq meters  BMI (Calculated): 37.3  Weight in (lb) to have BMI = 25: 189.1]     Body mass index is 37.26 kg/m².    Physical Exam  Constitutional:       Appearance: He is well-developed.   HENT:      Head: Normocephalic.   Eyes:      Pupils: Pupils are equal, round, and reactive to light.   Neck:      Thyroid: No thyromegaly.   Cardiovascular:      Rate and Rhythm: Normal rate and regular rhythm.      Heart sounds: No murmur heard.     No friction rub. No gallop.   Pulmonary:      Effort: Pulmonary effort is normal.      Breath sounds: Normal breath sounds.   Abdominal:      General: Bowel sounds are normal.      Palpations: Abdomen is soft.   Musculoskeletal:         General: Normal range of motion.      Cervical back: Normal range of motion.   Skin:     General: Skin is warm and dry.   Neurological:      Mental Status: He is alert and oriented to person, place, and time.      Sensory: No sensory deficit.   Psychiatric:         Behavior: Behavior normal.          Laboratory:  CBC:  No results for input(s): "WBC", "RBC", "HGB", "HCT", "PLT", "MCV", "MCH", "MCHC" in the last 2160 hours.  CMP:  No results for input(s): "GLU", "CALCIUM", "ALBUMIN", "PROT", "NA", "K", "CO2", "CL", "BUN", "ALKPHOS", "ALT", "AST", "BILITOT" in the last 2160 hours.    Invalid input(s): "CREATININ"  URINALYSIS:  No results for input(s): "COLORU", "CLARITYU", "SPECGRAV", "PHUR", "PROTEINUA", "GLUCOSEU", "BILIRUBINCON", "BLOODU", "WBCU", "RBCU", "BACTERIA", "MUCUS", "NITRITE", "LEUKOCYTESUR", "UROBILINOGEN", "HYALINECASTS" in the last 2160 hours.   LIPIDS:  No results for input(s): "TSH", "HDL", "CHOL", "TRIG", "LDLCALC", "CHOLHDL", "NONHDLCHOL", "TOTALCHOLEST" in the last 2160 hours.  TSH:  No results for input(s): "TSH" in the " "last 2160 hours.  A1C:  No results for input(s): "HGBA1C" in the last 2160 hours.    Radiology:        Assessment:     Homer Beatty is a 24 y.o.male with:    Autoimmune hepatitis  -     Comprehensive Metabolic Panel; Future; Expected date: 01/27/2025    Neuroendocrine tumor of pancreas    DANIELS (nonalcoholic steatohepatitis)  -     Comprehensive Metabolic Panel; Future; Expected date: 01/27/2025    ETHEL (obstructive sleep apnea)                Plan:     Problem List Items Addressed This Visit       Autoimmune hepatitis - Primary    Overview     Followed by GI at [facility].  Currently on 6MP         Neuroendocrine tumor of pancreas    DANIELS (nonalcoholic steatohepatitis)    ETHEL (obstructive sleep apnea)    Overview     Will refer to sleep medicine             As above, continue current medications and maintain follow up with specialists.  Return to clinic in 6 months.      Frederick W Dantagnan [facility]                  "

## 2025-01-28 ENCOUNTER — OFFICE VISIT (OUTPATIENT)
Dept: SLEEP MEDICINE | Facility: CLINIC | Age: 25
End: 2025-01-28
Payer: COMMERCIAL

## 2025-01-28 VITALS
HEART RATE: 80 BPM | HEIGHT: 73 IN | SYSTOLIC BLOOD PRESSURE: 100 MMHG | DIASTOLIC BLOOD PRESSURE: 60 MMHG | WEIGHT: 282.19 LBS | BODY MASS INDEX: 37.4 KG/M2

## 2025-01-28 DIAGNOSIS — F51.09 OTHER INSOMNIA NOT DUE TO A SUBSTANCE OR KNOWN PHYSIOLOGICAL CONDITION: ICD-10-CM

## 2025-01-28 DIAGNOSIS — G47.33 OSA (OBSTRUCTIVE SLEEP APNEA): Primary | ICD-10-CM

## 2025-01-28 DIAGNOSIS — R40.0 DAYTIME SLEEPINESS: ICD-10-CM

## 2025-01-28 PROCEDURE — 3078F DIAST BP <80 MM HG: CPT | Mod: CPTII,S$GLB,, | Performed by: PHYSICIAN ASSISTANT

## 2025-01-28 PROCEDURE — 1160F RVW MEDS BY RX/DR IN RCRD: CPT | Mod: CPTII,S$GLB,, | Performed by: PHYSICIAN ASSISTANT

## 2025-01-28 PROCEDURE — 99999 PR PBB SHADOW E&M-EST. PATIENT-LVL III: CPT | Mod: PBBFAC,,, | Performed by: PHYSICIAN ASSISTANT

## 2025-01-28 PROCEDURE — 1159F MED LIST DOCD IN RCRD: CPT | Mod: CPTII,S$GLB,, | Performed by: PHYSICIAN ASSISTANT

## 2025-01-28 PROCEDURE — 99214 OFFICE O/P EST MOD 30 MIN: CPT | Mod: S$GLB,,, | Performed by: PHYSICIAN ASSISTANT

## 2025-01-28 PROCEDURE — 3008F BODY MASS INDEX DOCD: CPT | Mod: CPTII,S$GLB,, | Performed by: PHYSICIAN ASSISTANT

## 2025-01-28 PROCEDURE — 3074F SYST BP LT 130 MM HG: CPT | Mod: CPTII,S$GLB,, | Performed by: PHYSICIAN ASSISTANT

## 2025-01-28 NOTE — PROGRESS NOTES
Referred by No ref. provider found     ESTABLISHED PATIENT VISIT    Homer Beatty  is a pleasant 24 y.o. male  with PMH significant for headaches, neuroendocrine tumor of pancrease, DANIELS, autoimmune hepatitis, hepatic fibrosis, , adjustment disorder, BMI 37+, ETHEL    Here today for: CPAP follow-up     Last visit 10/24/24:   Reports dx Severe ETHEL after HST was ordered as part of a work physical (AHI 32.7). [facility] for insurance purposes needs to establish care with sleep medicine through [facility], which is why he presents today. Does endorse sx of waking with dry mouth, poor disrupted and un-refreshing sleep, and excessive sleepiness and fatigue. Does endorse working shift work. Not currently using black out curtains. Does endorse light occasionally causing sleep disruptions when trying to sleep during the day. Denies snoring or witnessed apneas (no witness to sleep). Denies sleep walking/talking. Denies dream enactment behavior. Does endorse sx of RSL about 1-2 x weekly. Constant urge to move legs, cannot get comfortable. Improvement with movement. With mother in clinic today  PLAN:  -discussed HST results with patient in detail  -recommended trial of CPAP (gold standard) as tx for ETHEL, patient is open  -CPAP and supplies ordered  -discussed ETHEL and PAP with patient in detail, including possible complications of untreated ETHEL like heart attack/stroke  -advised on strict driving precautions; advised never to drive drowsy  -discussed shift work and sleep hygiene  -recommended blackout curtains/eye mask for daytime sleeping      Since last visit:   Using CPAP nightly with improvement in sx. Feels more rested when using, sleep is more consolidated. Sx of restless legs significantly improved since starting therapy. Does endorse initially having some difficulty adjusting to therapy at first, but doing much better now. States mask interface and pressure settings are comfortable. Presents today for compliance  visit.      PAP history   Problems    Mask FFM   Pressure 6-12cwp   DME HME   Machine age AS 11 12/9/24   Download 1/28/25: 28/30 x 5hrs 7mins, 6-12cwp (6.2/7.9/8.6), leak (2.2/15.5/81.9), AHI 0.2       SLEEP SCHEDULE   Environment    Bed Time varies   Sleep Latency 1-2hours (gets better sleep during night hours)   Arousals 1 (much more if sleeping during the day)   Nocturia 1   Back to sleep Usually quick   Wake time varies   Naps Occasional 30min afternoon nap   Work Shift work       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, selective     New onset of headaches 02/12/2018    Pancreatic cyst 05/07/2018    Regional lymph node metastasis present 11/01/2017     Patient Active Problem List   Diagnosis    Autoimmune hepatitis    Kyphosis    Body mass index, pediatric, greater than or equal to 95th percentile for age    Neuroendocrine tumor of pancreas    Family history of carcinoid tumor    Regional lymph node metastasis present    Adjustment disorder with anxious mood    New onset of headaches    IgA deficiency    Atypical chest pain    Pancreatic cyst    DANIELS (nonalcoholic steatohepatitis)    Hepatic fibrosis, early fibrosis    Class 2 obesity due to excess calories without serious comorbidity with body mass index (BMI) of 37.0 to 37.9 in adult    ETHEL (obstructive sleep apnea)       Current Outpatient Medications:     SEMAGLUTIDE SUBQ, Inject into the skin., Disp: , Rfl:      There were no vitals filed for this visit.    Physical Exam:    GEN:   Well-appearing  Psych:  Appropriate affect, demonstrates insight  SKIN:  No rash on the face or bridge of the nose      LABS:   Lab Results    Component Value Date    HGB 16.3 10/07/2024    CO2 25 10/07/2024         RECORDS REVIEWED:    6/3/24 HST (scanned into media): AHI 32.7, kenny 81%    Previous sleep note: 10/24/24    CPAP interrogation 25: 28/30 x 5hrs 7mins, 6-12cwp (6.2/7.9/8.6), leak (2.2/15.5/81.9), AHI 0.2    ASSESSMENT    Channing Sleepiness Scale:  Sitting and readin  Watching TV:    1  Passenger in a car x 1 hr:  1  Sitting quietly after lunch:  2  Lying down to rest in PM:  1  Sitting, inactive in public:  0  Sitting+ talking to someone:  0  Stopped in traffic:   0  Total        PROBLEM DESCRIPTION/ Sx on Presentation Interval Hx STATUS   Hx Severe ETHEL   unsure snoring, denies arousals, denies witnessed apneas  + wakes with dry mouth  HST  AHI 32 Good usage and efficiency  controlled   Daytime Sx   + sleepiness when inactive   Denies drowsiness when driving   ESS  on intake (reviewed from 10/24/24) Feels more rested on CPAP improved   Insomnia   Trouble falling asleep: 1-2hours  Arousals:         1-3  Hard to get back to sleep?: no issues    Prior pertinent medications:  Current pertinent medications:  Able to initiate sleep a little better with CPAP, more consolidated sleep overall improved   Nocturia   x 1 per sleep period 1 x nightly  stable   RLS Does endorse sx of RSL about 1-2 x weekly  Constant urge to move legs, cannot get comfortable  Improvement with movement   Ferritin  215 Significantly improved on cPAP improved   Other issues:       PLAN      -using and benefiting from CPAP therapy  -continue CPAP nightly  -discussed goals of CPAP usage of 7-9hours nightly with usage of >6hrs 100% of nights and minimal usage of >4hrs 70% of nights recommended  -CPAP supplies ordered  -discussed ETHEL and PAP with patient in detail, including possible complications of untreated ETHEL like heart attack/stroke  -advised on strict driving precautions; advised never to drive drowsy       Advised on plan of care. Answered all  patient questions. Patient verbalized understanding and voiced agreement with plan of care.     RTC  12 months or as needed      The patient was given open opportunity to ask questions and/or express concerns about treatment plan. All questions/concerns were discussed.     Two patient identifiers used prior to evaluation.

## 2025-01-31 ENCOUNTER — LAB VISIT (OUTPATIENT)
Dept: LAB | Facility: HOSPITAL | Age: 25
End: 2025-01-31
Payer: COMMERCIAL

## 2025-01-31 DIAGNOSIS — K75.81 NASH (NONALCOHOLIC STEATOHEPATITIS): ICD-10-CM

## 2025-01-31 DIAGNOSIS — K75.4 AUTOIMMUNE HEPATITIS: ICD-10-CM

## 2025-01-31 LAB
ALBUMIN SERPL BCP-MCNC: 4.2 G/DL (ref 3.5–5.2)
ALP SERPL-CCNC: 62 U/L (ref 40–150)
ALT SERPL W/O P-5'-P-CCNC: 60 U/L (ref 10–44)
ANION GAP SERPL CALC-SCNC: 8 MMOL/L (ref 8–16)
AST SERPL-CCNC: 38 U/L (ref 10–40)
BILIRUB SERPL-MCNC: 0.8 MG/DL (ref 0.1–1)
BUN SERPL-MCNC: 8 MG/DL (ref 6–20)
CALCIUM SERPL-MCNC: 9.1 MG/DL (ref 8.7–10.5)
CHLORIDE SERPL-SCNC: 107 MMOL/L (ref 95–110)
CO2 SERPL-SCNC: 26 MMOL/L (ref 23–29)
CREAT SERPL-MCNC: 1 MG/DL (ref 0.5–1.4)
EST. GFR  (NO RACE VARIABLE): >60 ML/MIN/1.73 M^2
GLUCOSE SERPL-MCNC: 84 MG/DL (ref 70–110)
POTASSIUM SERPL-SCNC: 3.8 MMOL/L (ref 3.5–5.1)
PROT SERPL-MCNC: 7.1 G/DL (ref 6–8.4)
SODIUM SERPL-SCNC: 141 MMOL/L (ref 136–145)

## 2025-01-31 PROCEDURE — 36415 COLL VENOUS BLD VENIPUNCTURE: CPT | Performed by: INTERNAL MEDICINE

## 2025-01-31 PROCEDURE — 80053 COMPREHEN METABOLIC PANEL: CPT | Performed by: INTERNAL MEDICINE

## 2025-03-26 ENCOUNTER — RESULTS FOLLOW-UP (OUTPATIENT)
Dept: PRIMARY CARE CLINIC | Facility: CLINIC | Age: 25
End: 2025-03-26

## 2025-04-12 ENCOUNTER — HOSPITAL ENCOUNTER (EMERGENCY)
Facility: HOSPITAL | Age: 25
Discharge: HOME | End: 2025-04-13
Attending: EMERGENCY MEDICINE
Payer: COMMERCIAL

## 2025-04-12 DIAGNOSIS — R10.9 ABDOMINAL PAIN, UNSPECIFIED ABDOMINAL LOCATION: ICD-10-CM

## 2025-04-12 DIAGNOSIS — R07.9 CHEST PAIN: ICD-10-CM

## 2025-04-12 DIAGNOSIS — K29.70 GASTRITIS, PRESENCE OF BLEEDING UNSPECIFIED, UNSPECIFIED CHRONICITY, UNSPECIFIED GASTRITIS TYPE: Primary | ICD-10-CM

## 2025-04-12 LAB
ABSOLUTE EOSINOPHIL (OHS): 0.13 K/UL
ABSOLUTE MONOCYTE (OHS): 0.66 K/UL (ref 0.3–1)
ABSOLUTE NEUTROPHIL COUNT (OHS): 4.3 K/UL (ref 1.8–7.7)
ALBUMIN SERPL BCP-MCNC: 4.2 G/DL (ref 3.5–5.2)
ALP SERPL-CCNC: 67 UNIT/L (ref 40–150)
ALT SERPL W/O P-5'-P-CCNC: 33 UNIT/L (ref 10–44)
ANION GAP (OHS): 7 MMOL/L (ref 8–16)
AST SERPL-CCNC: 21 UNIT/L (ref 11–45)
BASOPHILS # BLD AUTO: 0.02 K/UL
BASOPHILS NFR BLD AUTO: 0.3 %
BILIRUB SERPL-MCNC: 0.7 MG/DL (ref 0.1–1)
BILIRUB UR QL STRIP.AUTO: NEGATIVE
BUN SERPL-MCNC: 12 MG/DL (ref 6–20)
CALCIUM SERPL-MCNC: 9.4 MG/DL (ref 8.7–10.5)
CHLORIDE SERPL-SCNC: 106 MMOL/L (ref 95–110)
CLARITY UR: CLEAR
CO2 SERPL-SCNC: 27 MMOL/L (ref 23–29)
COLOR UR AUTO: YELLOW
CREAT SERPL-MCNC: 1.1 MG/DL (ref 0.5–1.4)
ERYTHROCYTE [DISTWIDTH] IN BLOOD BY AUTOMATED COUNT: 12.9 % (ref 11.5–14.5)
GFR SERPLBLD CREATININE-BSD FMLA CKD-EPI: >60 ML/MIN/1.73/M2
GLUCOSE SERPL-MCNC: 93 MG/DL (ref 70–110)
GLUCOSE UR QL STRIP: NEGATIVE
HCT VFR BLD AUTO: 47.3 % (ref 40–54)
HGB BLD-MCNC: 16.1 GM/DL (ref 14–18)
HGB UR QL STRIP: NEGATIVE
IMM GRANULOCYTES # BLD AUTO: 0.02 K/UL (ref 0–0.04)
IMM GRANULOCYTES NFR BLD AUTO: 0.3 % (ref 0–0.5)
KETONES UR QL STRIP: NEGATIVE
LEUKOCYTE ESTERASE UR QL STRIP: NEGATIVE
LIPASE SERPL-CCNC: 26 U/L (ref 4–60)
LYMPHOCYTES # BLD AUTO: 2.61 K/UL (ref 1–4.8)
MCH RBC QN AUTO: 30.7 PG (ref 27–31)
MCHC RBC AUTO-ENTMCNC: 34 G/DL (ref 32–36)
MCV RBC AUTO: 90 FL (ref 82–98)
NITRITE UR QL STRIP: NEGATIVE
NUCLEATED RBC (/100WBC) (OHS): 0 /100 WBC
PH UR STRIP: 6 [PH]
PLATELET # BLD AUTO: 215 K/UL (ref 150–450)
PMV BLD AUTO: 10.8 FL (ref 9.2–12.9)
POTASSIUM SERPL-SCNC: 4.1 MMOL/L (ref 3.5–5.1)
PROT SERPL-MCNC: 7.2 GM/DL (ref 6–8.4)
PROT UR QL STRIP: NEGATIVE
RBC # BLD AUTO: 5.24 M/UL (ref 4.6–6.2)
RELATIVE EOSINOPHIL (OHS): 1.7 %
RELATIVE LYMPHOCYTE (OHS): 33.7 % (ref 18–48)
RELATIVE MONOCYTE (OHS): 8.5 % (ref 4–15)
RELATIVE NEUTROPHIL (OHS): 55.5 % (ref 38–73)
SODIUM SERPL-SCNC: 140 MMOL/L (ref 136–145)
SP GR UR STRIP: 1.03
UROBILINOGEN UR STRIP-ACNC: ABNORMAL EU/DL
WBC # BLD AUTO: 7.74 K/UL (ref 3.9–12.7)

## 2025-04-12 PROCEDURE — 84484 ASSAY OF TROPONIN QUANT: CPT | Performed by: PHYSICIAN ASSISTANT

## 2025-04-12 PROCEDURE — 93005 ELECTROCARDIOGRAM TRACING: CPT

## 2025-04-12 PROCEDURE — 83690 ASSAY OF LIPASE: CPT | Performed by: PHYSICIAN ASSISTANT

## 2025-04-12 PROCEDURE — 93010 ELECTROCARDIOGRAM REPORT: CPT | Mod: ,,, | Performed by: INTERNAL MEDICINE

## 2025-04-12 PROCEDURE — 81003 URINALYSIS AUTO W/O SCOPE: CPT | Performed by: PHYSICIAN ASSISTANT

## 2025-04-12 PROCEDURE — 87389 HIV-1 AG W/HIV-1&-2 AB AG IA: CPT | Performed by: EMERGENCY MEDICINE

## 2025-04-12 PROCEDURE — 80053 COMPREHEN METABOLIC PANEL: CPT | Performed by: PHYSICIAN ASSISTANT

## 2025-04-12 PROCEDURE — 63600175 PHARM REV CODE 636 W HCPCS: Performed by: PHYSICIAN ASSISTANT

## 2025-04-12 PROCEDURE — 85025 COMPLETE CBC W/AUTO DIFF WBC: CPT | Performed by: PHYSICIAN ASSISTANT

## 2025-04-12 PROCEDURE — 99285 EMERGENCY DEPT VISIT HI MDM: CPT | Mod: 25

## 2025-04-12 PROCEDURE — 86803 HEPATITIS C AB TEST: CPT | Performed by: EMERGENCY MEDICINE

## 2025-04-12 PROCEDURE — 96374 THER/PROPH/DIAG INJ IV PUSH: CPT

## 2025-04-12 RX ORDER — FAMOTIDINE 10 MG/ML
20 INJECTION, SOLUTION INTRAVENOUS
Status: COMPLETED | OUTPATIENT
Start: 2025-04-12 | End: 2025-04-12

## 2025-04-12 RX ADMIN — FAMOTIDINE 20 MG: 10 INJECTION, SOLUTION INTRAVENOUS at 11:04

## 2025-04-13 VITALS
TEMPERATURE: 98 F | SYSTOLIC BLOOD PRESSURE: 142 MMHG | HEIGHT: 73 IN | WEIGHT: 257 LBS | DIASTOLIC BLOOD PRESSURE: 71 MMHG | BODY MASS INDEX: 34.06 KG/M2 | RESPIRATION RATE: 18 BRPM | OXYGEN SATURATION: 99 % | HEART RATE: 81 BPM

## 2025-04-13 LAB
HCV AB SERPL QL IA: NORMAL
HIV 1+2 AB+HIV1 P24 AG SERPL QL IA: NORMAL
OHS QRS DURATION: 94 MS
OHS QTC CALCULATION: 417 MS
TROPONIN I SERPL HS-MCNC: <3 NG/L

## 2025-04-13 PROCEDURE — 25500020 PHARM REV CODE 255: Performed by: EMERGENCY MEDICINE

## 2025-04-13 PROCEDURE — 25000003 PHARM REV CODE 250: Performed by: PHYSICIAN ASSISTANT

## 2025-04-13 RX ORDER — SUCRALFATE 1 G/10ML
1 SUSPENSION ORAL
Status: COMPLETED | OUTPATIENT
Start: 2025-04-13 | End: 2025-04-13

## 2025-04-13 RX ORDER — FAMOTIDINE 20 MG/1
20 TABLET, FILM COATED ORAL 2 TIMES DAILY PRN
Qty: 60 TABLET | Refills: 0 | Status: SHIPPED | OUTPATIENT
Start: 2025-04-13 | End: 2025-05-13

## 2025-04-13 RX ADMIN — IOHEXOL 100 ML: 350 INJECTION, SOLUTION INTRAVENOUS at 01:04

## 2025-04-13 RX ADMIN — SUCRALFATE 1 G: 1 SUSPENSION ORAL at 01:04

## 2025-04-13 NOTE — ED TRIAGE NOTES
Homer Beatty, a 24 y.o. male presents to the ED w/ complaint of left side rib pain. Patient states having left sided rib pain that has been intermittent. States that it radiates to the left shoulder and left side of his back. Endorses some shortness of breath that is intermittent. Patient denies trauma to the area. Patient denies N/V/D, abdominal pain    Triage note:  Chief Complaint   Patient presents with    Rib Injury     Pt arrive to the ED with a complaint of left side rib pain radiate to the back,Sob.Pt denies any chest pain,any recent injuries.     Review of patient's allergies indicates:   Allergen Reactions    Tylenol [acetaminophen]      Patient has Autoimmune Hepatitis/Takes 6MP    Latex, natural rubber Rash     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, selective     New onset of headaches 02/12/2018    Pancreatic cyst 05/07/2018    Regional lymph node metastasis present 11/01/2017

## 2025-04-13 NOTE — PROGRESS NOTES
I received this patient in changeover.  Briefly, patient is a 24-year-old male with history of autoimmune hepatitis and remote history of neuroendocrine tumor of the pancrease s/p resection - not currently on any therapy. Here today for LUQ abdominal pain, TTP in the entire left side of his abdomen. No cp or sob, no fever, having associated nausea but no vomiting or diarrhea.    So far, patient has a improved with Pepcid.  He is also getting Carafate.  Lab work reassuring.  Chest x-ray clear.  He is pending CT abdomen pelvis at time of changeover.  Symptoms seem more consistent with gastritis/GERD.  If CT negative, plan for discharge home with PCP follow up and Pepcid.    CT reviewed.  No acute findings.  Resting comfortably with benign repeat abdominal exam on re-evaluation.  Counseled on return precautions prior to discharge.  Prescribed Pepcid for gastritis.  Advised to follow up with primary care within the next week.      Imaging Results              CT Abdomen Pelvis With IV Contrast NO Oral Contrast (Final result)  Result time 04/13/25 02:12:25      Final result by Williams Esposito MD (04/13/25 02:12:25)                   Impression:      No acute abdominopelvic abnormality.    Postoperative changes of distal pancreatectomy for neuroendocrine tumor.  No evidence of recurrent lesion.    Hepatomegaly.    Electronically signed by resident: Sea Reyes  Date:    04/13/2025  Time:    01:31    Electronically signed by: Williams Esposito  Date:    04/13/2025  Time:    02:12               Narrative:    EXAMINATION:  CT ABDOMEN PELVIS WITH IV CONTRAST    CLINICAL HISTORY:  L sided abdominal pain;    TECHNIQUE:  The patient was surveyed from the lung bases through the pelvis after the administration of 100 cc Omni 350 IV contrast.  Oral contrast was not administered. The data was reconstructed for coronal, sagittal, and axial images.    COMPARISON:  None.    FINDINGS:  LOWER THORAX: No pulmonary consolidation or  pleural effusion. The base of the heart is normal in size without pericardial effusion.    HEPATOBILIARY: Liver is enlarged measuring 18.8 cm craniocaudal.  No focal hepatic lesions. No calcified gallstones, gallbladder wall thickening, or pericholecystic fluid. No biliary ductal dilatation.    SPLEEN: Normal size without focal lesion.    PANCREAS: Postoperative changes of distal pancreatectomy for neuroendocrine tumor.  No focal masses or ductal dilatation.    ADRENALS: No adrenal nodules.    KIDNEYS/URETERS: No hydronephrosis, stones, or solid lesions. Ureters are normal in caliber without inflammatory change.    BLADDER/PELVIC ORGANS: Bladder demonstrates normal contours without focal wall thickening.Normal size prostate.    PERITONEUM / RETROPERITONEUM: No free air or fluid.    LYMPH NODES: No lymphadenopathy.    VESSELS: Abdominal aorta is normal in course and caliber.Aortic branch vessels are patent.Portal venous system is patent.    GI TRACT: Lower esophagus appears normal. No focal gastric wall thickening. No bowel distention or wall thickening. Normal appendix.Colonic diverticulosis.    BONES AND SOFT TISSUES: No significant soft tissue abnormalities. No fractures or focal osseous lesions.                                       X-Ray Chest AP Portable (Final result)  Result time 04/12/25 23:46:29      Final result by Candie Osorio MD (04/12/25 23:46:29)                   Impression:      No acute intrathoracic abnormality identified on this single radiographic view of the chest.      Electronically signed by: Candie Osorio MD  Date:    04/12/2025  Time:    23:46               Narrative:    EXAMINATION:  XR CHEST AP PORTABLE    CLINICAL HISTORY:  Chest pain, unspecified    TECHNIQUE:  Single frontal view of the chest was performed.    COMPARISON:  07/04/2022    FINDINGS:  The cardiomediastinal silhouette is within normal limits. Mediastinal structures are midline.  The lungs appear symmetrically expanded  without definite evidence of confluent airspace consolidation, significant volume of pleural fluid or pneumothorax.  Visualized osseous structures are intact.

## 2025-04-13 NOTE — ED PROVIDER NOTES
Encounter Date: 4/12/2025       History     Chief Complaint   Patient presents with    Rib Injury     Pt arrive to the ED with a complaint of left side rib pain radiate to the back,Sob.Pt denies any chest pain,any recent injuries.     24-year-old male with a PMHx of autoimmune hepatitis, neuroendocrine tumor of pancreas s/p resection, IgA deficiency presents to the ED with left upper quadrant abdominal pain x2 weeks.  He has associated intermittent nausea without vomiting.  Pain radiates to his left shoulder blade.  He has increase pain with deep breaths.  He does not have chest pain or shortness of breath. No ibuprofen or alcohol use.  He notes a decreased appetite as eating makes pain worse.  No fall or injury.  It is not made worse by movement.  He denies fever, chills, shortness of breath, vomiting, diarrhea, bloody stool, flank pain, dysuria, hematuria.    The history is provided by the patient.     Review of patient's allergies indicates:   Allergen Reactions    Tylenol [acetaminophen]      Patient has Autoimmune Hepatitis/Takes 6MP    Latex, natural rubber Rash     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, selective     New onset of headaches 02/12/2018    Pancreatic cyst 05/07/2018    Regional lymph node metastasis present 11/01/2017     Past Surgical History:   Procedure Laterality Date    CIRCUMCISION      LIVER BIOPSY  3/15/2013    PANCREATECTOMY      TONSILLECTOMY, ADENOIDECTOMY       Family History   Problem Relation Name Age of Onset    No Known Problems Mother David     Cancer Father Fernie 46        Spindle cell sarcoma on back, [place]: radiation    Cancer Maternal  Grandfather Godwin 72        adrenal carcinoma    Breast cancer Paternal Aunt Aixa 39        BRCA2+    Heart disease Maternal Grandmother Rich Vega     Hypertension Maternal Grandmother Rich Vega     Diabetes Maternal Grandmother Rich Vega     Basal cell carcinoma Paternal Grandmother Viky 60 - 69        Nose    Cancer Paternal Grandfather Mo 63        small intestine NET    Colon polyps Paternal Grandfather Mo     Cancer Other James         adrenal tumor    Melanoma Neg Hx      Lupus Neg Hx      Psoriasis Neg Hx      Congenital heart disease Neg Hx      Pacemaker/defibrilator Neg Hx      Early death Neg Hx       Social History[1]  Review of Systems    Physical Exam     Initial Vitals [04/12/25 2213]   BP Pulse Resp Temp SpO2   (!) 143/83 83 20 97.7 °F (36.5 °C) 99 %      MAP       --         Physical Exam    Nursing note and vitals reviewed.  Constitutional: He appears well-developed and well-nourished. He is not diaphoretic. No distress.   HENT:   Head: Normocephalic and atraumatic.   Nose: Nose normal.   Eyes: Conjunctivae and EOM are normal.   Neck: Neck supple.   Cardiovascular:  Normal rate, regular rhythm, normal heart sounds and intact distal pulses.           Pulmonary/Chest: Breath sounds normal. No respiratory distress.   Abdominal: A surgical scar is present. There is abdominal tenderness in the left upper quadrant and left lower quadrant.   No right CVA tenderness.  No left CVA tenderness. There is guarding.   Musculoskeletal:      Cervical back: Neck supple.     Neurological: He is alert and oriented to person, place, and time.   Skin: No rash noted.   Psychiatric: He has a normal mood and affect. Thought content normal.         ED Course   Procedures  Labs Reviewed   COMPREHENSIVE METABOLIC PANEL - Abnormal       Result Value    Sodium 140      Potassium 4.1      Chloride 106      CO2 27      Glucose 93      BUN 12      Creatinine 1.1      Calcium 9.4      Protein Total 7.2      Albumin 4.2       Bilirubin Total 0.7      ALP 67      AST 21      ALT 33      Anion Gap 7 (*)     eGFR >60     URINALYSIS, REFLEX TO URINE CULTURE - Abnormal    Color, UA Yellow      Appearance, UA Clear      pH, UA 6.0      Spec Grav UA 1.030      Protein, UA Negative      Glucose, UA Negative      Ketones, UA Negative      Bilirubin, UA Negative      Blood, UA Negative      Nitrites, UA Negative      Urobilinogen, UA 2.0-3.0 (*)     Leukocyte Esterase, UA Negative     HEPATITIS C ANTIBODY - Normal    Hep C Ab Interp Non-Reactive     HIV 1 / 2 ANTIBODY - Normal    HIV 1/2 Ag/Ab Non-Reactive     LIPASE - Normal    Lipase Level 26     CBC WITH DIFFERENTIAL - Normal    WBC 7.74      RBC 5.24      HGB 16.1      HCT 47.3      MCV 90      MCH 30.7      MCHC 34.0      RDW 12.9      Platelet Count 215      MPV 10.8      Nucleated RBC 0      Neut % 55.5      Lymph % 33.7      Mono % 8.5      Eos % 1.7      Basophil % 0.3      Imm Grans % 0.3      Neut # 4.30      Lymph # 2.61      Mono # 0.66      Eos # 0.13      Baso # 0.02      Imm Grans # 0.02     TROPONIN I HIGH SENSITIVITY - Normal    Troponin High Sensitive <3     CBC W/ AUTO DIFFERENTIAL    Narrative:     The following orders were created for panel order CBC W/ AUTO DIFFERENTIAL.  Procedure                               Abnormality         Status                     ---------                               -----------         ------                     CBC with Differential[[id number]]       Normal              Final result                 Please view results for these tests on the individual orders.   [facility] URINE HOLD     EKG Readings: (Independently Interpreted)   Initial Reading: No STEMI. Rhythm: Normal Sinus Rhythm. Heart Rate: 80. Axis: Normal. Clinical Impression: Normal Sinus Rhythm     ECG Results              EKG 12-lead (Final result)        Collection Time Result Time QRS Duration OHS QTC Calculation    04/12/25 22:18:51 04/13/25 09:20:16 94 417                      Final result by Interface, Lab In [facility] [place] (04/13/25 09:20:21)                   Narrative:    Test Reason : R07.9,    Vent. Rate :  80 BPM     Atrial Rate :  80 BPM     P-R Int : 162 ms          QRS Dur :  94 ms      QT Int : 362 ms       P-R-T Axes :  69  31  40 degrees    QTcB Int : 417 ms    Normal sinus rhythm  Normal ECG  When compared with ECG of 04-Jul-2022 21:32,  No significant change was found  Confirmed by Jeremías Salas ([id number]) on 4/13/2025 9:20:12 AM    Referred By: AAAREFERRAL SELF           Confirmed By: Jeremías Salas                                  Imaging Results              CT Abdomen Pelvis With IV Contrast NO Oral Contrast (Final result)  Result time 04/13/25 02:12:25      Final result by Williams Esposito MD (04/13/25 02:12:25)                   Impression:      No acute abdominopelvic abnormality.    Postoperative changes of distal pancreatectomy for neuroendocrine tumor.  No evidence of recurrent lesion.    Hepatomegaly.    Electronically signed by resident: Sea Reyes  Date:    04/13/2025  Time:    01:31    Electronically signed by: Williams Esposito  Date:    04/13/2025  Time:    02:12               Narrative:    EXAMINATION:  CT ABDOMEN PELVIS WITH IV CONTRAST    CLINICAL HISTORY:  L sided abdominal pain;    TECHNIQUE:  The patient was surveyed from the lung bases through the pelvis after the administration of 100 cc Omni 350 IV contrast.  Oral contrast was not administered. The data was reconstructed for coronal, sagittal, and axial images.    COMPARISON:  None.    FINDINGS:  LOWER THORAX: No pulmonary consolidation or pleural effusion. The base of the heart is normal in size without pericardial effusion.    HEPATOBILIARY: Liver is enlarged measuring 18.8 cm craniocaudal.  No focal hepatic lesions. No calcified gallstones, gallbladder wall thickening, or pericholecystic fluid. No biliary ductal dilatation.    SPLEEN: Normal size without focal lesion.    PANCREAS: Postoperative changes of  distal pancreatectomy for neuroendocrine tumor.  No focal masses or ductal dilatation.    ADRENALS: No adrenal nodules.    KIDNEYS/URETERS: No hydronephrosis, stones, or solid lesions. Ureters are normal in caliber without inflammatory change.    BLADDER/PELVIC ORGANS: Bladder demonstrates normal contours without focal wall thickening.Normal size prostate.    PERITONEUM / RETROPERITONEUM: No free air or fluid.    LYMPH NODES: No lymphadenopathy.    VESSELS: Abdominal aorta is normal in course and caliber.Aortic branch vessels are patent.Portal venous system is patent.    GI TRACT: Lower esophagus appears normal. No focal gastric wall thickening. No bowel distention or wall thickening. Normal appendix.Colonic diverticulosis.    BONES AND SOFT TISSUES: No significant soft tissue abnormalities. No fractures or focal osseous lesions.                                       X-Ray Chest AP Portable (Final result)  Result time 04/12/25 23:46:29      Final result by Candie Osorio MD (04/12/25 23:46:29)                   Impression:      No acute intrathoracic abnormality identified on this single radiographic view of the chest.      Electronically signed by: Candie Osorio MD  Date:    04/12/2025  Time:    23:46               Narrative:    EXAMINATION:  XR CHEST AP PORTABLE    CLINICAL HISTORY:  Chest pain, unspecified    TECHNIQUE:  Single frontal view of the chest was performed.    COMPARISON:  07/04/2022    FINDINGS:  The cardiomediastinal silhouette is within normal limits. Mediastinal structures are midline.  The lungs appear symmetrically expanded without definite evidence of confluent airspace consolidation, significant volume of pleural fluid or pneumothorax.  Visualized osseous structures are intact.                                       Medications   famotidine (PF) injection 20 mg (20 mg Intravenous Given 4/12/25 8664)   iohexoL (OMNIPAQUE 350) injection 100 mL (100 mLs Intravenous Given 4/13/25 0128)   sucralfate  100 mg/mL suspension 1 g (1 g Oral Given 4/13/25 0106)     Medical Decision Making  24-year-old male with a PMHx of autoimmune hepatitis, neuroendocrine tumor of pancreas, IgA deficiency presents to the ED with left upper quadrant abdominal pain x2 weeks. Nontoxic appearing. Vitals with HTN. Afebrile. Exam as above. I will initiate workup and reassess.    Ddx: gastritis/PUD, hernia, obstructive uropathy, colitis, pancreatitis, ACS, pleural effusion, pneumothorax, rib injury    Laboratory workup is reassuring. Abdominal labs are reassuring. LFTs, alk phos, bilirubin, lipase are WNL.  UA is negative for signs of infection or blood concerning for obstructive uropathy.  I do not suspect acute cardiopulmonary cause of symptoms. EKG with NSR without ST segmental elevation or depression concerning for acute ACS. CXR without infiltrate, effusion, pneumothorax, mass or other acute findings. Pain improving after pepcid. May consider gastritis as cause of pain if CT AP is unrevealing for other etiology. Carafate order. Pending CT AP study at this time.I will sign patient out to ED provider due to shift change.     Amount and/or Complexity of Data Reviewed  Labs: ordered. Decision-making details documented in ED Course.  Radiology: ordered.    Risk  Prescription drug management.               ED Course as of 04/13/25 1657   Sat Apr 12, 2025   2351 CBC W/ AUTO DIFFERENTIAL  Unremarkable [HM]   2351 Comp. Metabolic Panel(!)  Unremarkable [HM]   2351 Lipase: 26 [HM]   Sun Apr 13, 2025   0019 Urinalysis, Reflex to Urine Culture(!)  UA is non-infectious  [HM]   0157 Troponin I High Sensitivity: <3 [HM]      ED Course User Index  [HM] Sujatha Braswell PA-C                           Clinical Impression:  Final diagnoses:  [R07.9] Chest pain  [K29.70] Gastritis, presence of bleeding unspecified, unspecified chronicity, unspecified gastritis type (Primary)  [R10.9] Abdominal pain, unspecified abdominal location          ED  Disposition Condition    Discharge Stable          ED Prescriptions       Medication Sig Dispense Start Date End Date Auth. Provider    famotidine (PEPCID) 20 MG tablet Take 1 tablet (20 mg total) by mouth 2 (two) times daily as needed for Heartburn. 60 tablet 4/13/2025 5/13/2025 Makenna Irby MD          Follow-up Information       Follow up With Specialties Details Why Contact Info    Torsten Renteria MD Internal Medicine Schedule an appointment as soon as possible for a visit   [address]  [facility]  [facility] [address]  [phone]      [facility] - Emergency Dept Emergency Medicine  As needed, If symptoms worsen [address] [address] [phone]                 [1]   Social History  Tobacco Use    Smoking status: Never    Smokeless tobacco: Never   Substance Use Topics    Alcohol use: No    Drug use: No        Sujatha Braswell PA-C  04/13/25 2919

## 2025-04-13 NOTE — DISCHARGE INSTRUCTIONS
Your workup today was reassuring.  It could be that you have gastritis sent you improved with the medications given to you today.  You have been prescribed Pepcid to take as needed for similar symptoms.  Please follow up with your primary care doctor within the next week.  If you develop any worsening or persistent pain or pain that has not improved with Pepcid, fevers, chills, vomiting, signs of dehydration or any other worsening symptoms or concerns, you should return to the emergency department for re-evaluation.

## 2025-07-28 ENCOUNTER — OFFICE VISIT (OUTPATIENT)
Dept: PRIMARY CARE CLINIC | Facility: CLINIC | Age: 25
End: 2025-07-28
Payer: COMMERCIAL

## 2025-07-28 VITALS
DIASTOLIC BLOOD PRESSURE: 74 MMHG | HEART RATE: 82 BPM | HEIGHT: 73 IN | SYSTOLIC BLOOD PRESSURE: 138 MMHG | OXYGEN SATURATION: 98 % | WEIGHT: 246.69 LBS | BODY MASS INDEX: 32.7 KG/M2

## 2025-07-28 DIAGNOSIS — G47.33 OSA (OBSTRUCTIVE SLEEP APNEA): ICD-10-CM

## 2025-07-28 DIAGNOSIS — D3A.8 NEUROENDOCRINE TUMOR OF PANCREAS: ICD-10-CM

## 2025-07-28 DIAGNOSIS — K75.4 AUTOIMMUNE HEPATITIS: Primary | ICD-10-CM

## 2025-07-28 PROCEDURE — 3078F DIAST BP <80 MM HG: CPT | Mod: CPTII,S$GLB,, | Performed by: INTERNAL MEDICINE

## 2025-07-28 PROCEDURE — 99999 PR PBB SHADOW E&M-EST. PATIENT-LVL III: CPT | Mod: PBBFAC,,, | Performed by: INTERNAL MEDICINE

## 2025-07-28 PROCEDURE — 3008F BODY MASS INDEX DOCD: CPT | Mod: CPTII,S$GLB,, | Performed by: INTERNAL MEDICINE

## 2025-07-28 PROCEDURE — 99214 OFFICE O/P EST MOD 30 MIN: CPT | Mod: S$GLB,,, | Performed by: INTERNAL MEDICINE

## 2025-07-28 PROCEDURE — 3075F SYST BP GE 130 - 139MM HG: CPT | Mod: CPTII,S$GLB,, | Performed by: INTERNAL MEDICINE

## 2025-07-28 PROCEDURE — 1160F RVW MEDS BY RX/DR IN RCRD: CPT | Mod: CPTII,S$GLB,, | Performed by: INTERNAL MEDICINE

## 2025-07-28 PROCEDURE — 1159F MED LIST DOCD IN RCRD: CPT | Mod: CPTII,S$GLB,, | Performed by: INTERNAL MEDICINE

## 2025-07-28 NOTE — PROGRESS NOTES
[facility] Note    Chief Complaint      Chief Complaint   Patient presents with    Follow-up       History of Present Illness      Homer Beatty is a 25 y.o. male who presents today for   Chief Complaint   Patient presents with    Follow-up   .  Patient comes to appointment here for 6m checkup for chronic issues as below . He is uptodate with hepatology f/u with dr england . He is utilizing his cpap nightly with good benefit . Dr abraham for neuroendocrine of pancreas . Current doing well wit glp 1 for weight management .     HPI    No problem-specific Assessment & Plan notes found for this encounter.       Problem List Items Addressed This Visit       Autoimmune hepatitis - Primary    Overview   [facility] GI [facility].          Neuroendocrine tumor of pancreas    Overview   D rdu maanging              Past Medical History:  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, selective     New onset of headaches 02/12/2018    Pancreatic cyst 05/07/2018    Regional lymph node metastasis present 11/01/2017       Past Surgical History:  Past Surgical History:   Procedure Laterality Date    CIRCUMCISION      LIVER BIOPSY  3/15/2013    PANCREATECTOMY      TONSILLECTOMY, ADENOIDECTOMY         Family History:  family history includes Basal cell carcinoma (age of onset: 60 - 69) in his paternal grandmother; Breast cancer (age of onset: 39) in his paternal aunt; Cancer in an other family member; Cancer (age of onset: 46) in his father; Cancer (age of onset: 63) in his paternal grandfather; Cancer (age of onset: 72) in his maternal grandfather; Colon polyps in his  "paternal grandfather; Diabetes in his maternal grandmother; Heart disease in his maternal grandmother; Hypertension in his maternal grandmother; No Known Problems in his mother.     Social History:  Social History[1]    Review of Systems:   Review of Systems   Constitutional:  Negative for fever and weight loss.   HENT:  Negative for congestion, hearing loss and sore throat.    Eyes:  Negative for blurred vision.   Respiratory:  Negative for cough and shortness of breath.    Cardiovascular:  Negative for chest pain, palpitations, claudication and leg swelling.   Gastrointestinal:  Negative for abdominal pain, constipation, diarrhea and heartburn.   Genitourinary:  Negative for dysuria.   Musculoskeletal:  Negative for back pain and myalgias.   Skin:  Negative for rash.   Neurological:  Negative for focal weakness and headaches.   Psychiatric/Behavioral:  Negative for depression and suicidal ideas. The patient is not nervous/anxious.         Medications:  Encounter Medications[2]    Allergies:  Review of patient's allergies indicates:   Allergen Reactions    Tylenol [acetaminophen]      Patient has Autoimmune Hepatitis/Takes 6MP    Latex, natural rubber Rash         Physical Exam      Vitals:    07/28/25 1432   BP: 138/74   Pulse: 82        Vital Signs  Pulse: 82  SpO2: 98 %  BP: 138/74  BP Location: Right arm  Patient Position: Sitting  Pain Score: 0-No pain  Height and Weight  Height: 6' 1" (185.4 cm)  Weight: 111.9 kg (246 lb 11.1 oz)  BSA (Calculated - sq m): 2.4 sq meters  BMI (Calculated): 32.6  Weight in (lb) to have BMI = 25: 189.1]     Body mass index is 32.55 kg/m².    Physical Exam  Constitutional:       Appearance: He is well-developed.   HENT:      Head: Normocephalic.   Eyes:      Pupils: Pupils are equal, round, and reactive to light.   Neck:      Thyroid: No thyromegaly.   Cardiovascular:      Rate and Rhythm: Normal rate and regular rhythm.      Heart sounds: No murmur heard.     No friction rub. No " "gallop.   Pulmonary:      Effort: Pulmonary effort is normal.      Breath sounds: Normal breath sounds.   Abdominal:      General: Bowel sounds are normal.      Palpations: Abdomen is soft.   Musculoskeletal:         General: Normal range of motion.      Cervical back: Normal range of motion.   Skin:     General: Skin is warm and dry.   Neurological:      Mental Status: He is alert and oriented to person, place, and time.      Sensory: No sensory deficit.   Psychiatric:         Behavior: Behavior normal.          Laboratory:  CBC:  No results for input(s): "WBC", "RBC", "HGB", "HCT", "PLT", "MCV", "MCH", "MCHC" in the last 2160 hours.  CMP:  No results for input(s): "GLU", "CALCIUM", "ALBUMIN", "PROT", "NA", "K", "CO2", "CL", "BUN", "ALKPHOS", "ALT", "AST", "BILITOT" in the last 2160 hours.    Invalid input(s): "CREATININ"  URINALYSIS:  No results for input(s): "COLORU", "CLARITYU", "SPECGRAV", "PHUR", "PROTEINUA", "GLUCOSEU", "BILIRUBINCON", "BLOODU", "WBCU", "RBCU", "BACTERIA", "MUCUS", "NITRITE", "LEUKOCYTESUR", "UROBILINOGEN", "HYALINECASTS" in the last 2160 hours.   LIPIDS:  No results for input(s): "TSH", "HDL", "CHOL", "TRIG", "LDLCALC", "CHOLHDL", "NONHDLCHOL", "TOTALCHOLEST" in the last 2160 hours.  TSH:  No results for input(s): "TSH" in the last 2160 hours.  A1C:  No results for input(s): "HGBA1C" in the last 2160 hours.    Radiology:        Assessment:     Homer Beatty is a 25 y.o.male with:    Autoimmune hepatitis    Neuroendocrine tumor of pancreas                Plan:     Problem List Items Addressed This Visit       Autoimmune hepatitis - Primary    Overview   Followed by GI at [facility].          Neuroendocrine tumor of pancreas    Overview   D rdu maanging             As above, continue current medications and maintain follow up with specialists.  Return to clinic in 6 months.    '  [facility]                       [1] "   Social History  Socioeconomic History    Marital status: Single   Tobacco Use    Smoking status: Never    Smokeless tobacco: Never   Substance and Sexual Activity    Alcohol use: No    Drug use: No    Sexual activity: Never   Social History Narrative    2 cats and a dog        Lives with mom (Viky), and 2  Brother.  Also goes to parents.        Attends [employer] 9th grade        Mother smokes.   [2]   Outpatient Encounter Medications as of 7/28/2025   Medication Sig Dispense Refill    famotidine (PEPCID) 20 MG tablet Take 1 tablet (20 mg total) by mouth 2 (two) times daily as needed for Heartburn. (Patient not taking: Reported on 7/28/2025) 60 tablet 0    SEMAGLUTIDE SUBQ Inject into the skin.       No facility-administered encounter medications on file as of 7/28/2025.

## 2025-08-06 ENCOUNTER — TELEPHONE (OUTPATIENT)
Dept: HEMATOLOGY/ONCOLOGY | Facility: CLINIC | Age: 25
End: 2025-08-06
Payer: COMMERCIAL

## 2025-08-06 NOTE — TELEPHONE ENCOUNTER
Attempted to contact the patient to schedule an upcoming appointment, but was unable to reach them. I left a voicemail with a callback number and will also send a reminder letter by mail.

## (undated) DEVICE — SYR 30CC LUER LOCK

## (undated) DEVICE — SEE MEDLINE ITEM 157128

## (undated) DEVICE — ELECTRODE REM PLYHSV RETURN 9

## (undated) DEVICE — HEMOSTAT SURGICEL NU-KNIT 6X9

## (undated) DEVICE — SYS CLSR DERMABOND PRINEO 22CM

## (undated) DEVICE — PAD K-THERMIA 24IN X 60IN

## (undated) DEVICE — NDL HYPO A BEVEL 22X1 1/2

## (undated) DEVICE — SEE MEDLINE ITEM 157144

## (undated) DEVICE — TUBE PENROSE DRAIN 18INX5/8IN

## (undated) DEVICE — KIT IRR SUCTION HND PIECE

## (undated) DEVICE — CONTAINER SPECIMEN STRL 4OZ

## (undated) DEVICE — SUT PROLENE 2-0 30 SH

## (undated) DEVICE — DRESSING ADH ISLAND 3.6 X 14

## (undated) DEVICE — SUT SILK 3-0 SH 18IN BLACK

## (undated) DEVICE — SUT 2/0 30IN SILK BLK BRAI

## (undated) DEVICE — SEE MEDLINE ITEM 146313

## (undated) DEVICE — GOWN SURGICAL X-LARGE

## (undated) DEVICE — SEE MEDLINE ITEM 156902

## (undated) DEVICE — Device

## (undated) DEVICE — APPLICATOR CHLORAPREP ORN 26ML

## (undated) DEVICE — SUT 3-0 12-18IN SILK

## (undated) DEVICE — SEE MEDLINE ITEM 152622

## (undated) DEVICE — KIT SAHARA DRAPE DRAW/LIFT

## (undated) DEVICE — SUT VICRYL BR 1 GEN 27 CT-1

## (undated) DEVICE — TRAY FOLEY 16FR INFECTION CONT

## (undated) DEVICE — SUT PROLENE 5-0 36 V-5 V-5

## (undated) DEVICE — SUT SILK 2-0 STRANDS 30IN

## (undated) DEVICE — SUT PROLENE 3-0 SH DA 36 BL

## (undated) DEVICE — SUT SILK 2-0 SH 18IN BLACK

## (undated) DEVICE — SUT SILK 3-0 STRANDS 30IN

## (undated) DEVICE — GOWN SMART IMP BREATHABLE XXLG

## (undated) DEVICE — BLADE SURG #15 CARBON STEEL

## (undated) DEVICE — DRAPE ABDOMINAL TIBURON 14X11

## (undated) DEVICE — GAUZE SPONGE 4X4 12PLY

## (undated) DEVICE — SUT 2-0 SILK 30IN BLK BRAID

## (undated) DEVICE — SUT ETHILON 2-0 PSLX 30IN

## (undated) DEVICE — SUT 1 48IN PDS II VIO MONO

## (undated) DEVICE — SUT 2-0 12-18IN SILK

## (undated) DEVICE — STAPLER SKIN PROXIMATE WIDE